# Patient Record
Sex: MALE | Race: WHITE | NOT HISPANIC OR LATINO | Employment: UNEMPLOYED | RURAL
[De-identification: names, ages, dates, MRNs, and addresses within clinical notes are randomized per-mention and may not be internally consistent; named-entity substitution may affect disease eponyms.]

---

## 2020-05-05 ENCOUNTER — HISTORICAL (OUTPATIENT)
Dept: ADMINISTRATIVE | Facility: HOSPITAL | Age: 25
End: 2020-05-05

## 2020-06-05 ENCOUNTER — HISTORICAL (OUTPATIENT)
Dept: ADMINISTRATIVE | Facility: HOSPITAL | Age: 25
End: 2020-06-05

## 2020-09-14 ENCOUNTER — HISTORICAL (OUTPATIENT)
Dept: ADMINISTRATIVE | Facility: HOSPITAL | Age: 25
End: 2020-09-14

## 2021-03-01 ENCOUNTER — HISTORICAL (OUTPATIENT)
Dept: ADMINISTRATIVE | Facility: HOSPITAL | Age: 26
End: 2021-03-01

## 2021-09-09 ENCOUNTER — OFFICE VISIT (OUTPATIENT)
Dept: FAMILY MEDICINE | Facility: CLINIC | Age: 26
End: 2021-09-09
Payer: COMMERCIAL

## 2021-09-09 VITALS
HEART RATE: 94 BPM | SYSTOLIC BLOOD PRESSURE: 150 MMHG | TEMPERATURE: 99 F | OXYGEN SATURATION: 97 % | RESPIRATION RATE: 16 BRPM | BODY MASS INDEX: 35.55 KG/M2 | HEIGHT: 69 IN | WEIGHT: 240 LBS | DIASTOLIC BLOOD PRESSURE: 94 MMHG

## 2021-09-09 DIAGNOSIS — M79.671 RIGHT FOOT PAIN: Primary | ICD-10-CM

## 2021-09-09 PROCEDURE — 1160F PR REVIEW ALL MEDS BY PRESCRIBER/CLIN PHARMACIST DOCUMENTED: ICD-10-PCS | Mod: ,,, | Performed by: NURSE PRACTITIONER

## 2021-09-09 PROCEDURE — 1160F RVW MEDS BY RX/DR IN RCRD: CPT | Mod: ,,, | Performed by: NURSE PRACTITIONER

## 2021-09-09 PROCEDURE — 1159F PR MEDICATION LIST DOCUMENTED IN MEDICAL RECORD: ICD-10-PCS | Mod: ,,, | Performed by: NURSE PRACTITIONER

## 2021-09-09 PROCEDURE — 99213 OFFICE O/P EST LOW 20 MIN: CPT | Mod: ,,, | Performed by: NURSE PRACTITIONER

## 2021-09-09 PROCEDURE — 3077F SYST BP >= 140 MM HG: CPT | Mod: ,,, | Performed by: NURSE PRACTITIONER

## 2021-09-09 PROCEDURE — 84550 URIC ACID: ICD-10-PCS | Mod: ,,, | Performed by: CLINICAL MEDICAL LABORATORY

## 2021-09-09 PROCEDURE — 3080F DIAST BP >= 90 MM HG: CPT | Mod: ,,, | Performed by: NURSE PRACTITIONER

## 2021-09-09 PROCEDURE — 3008F BODY MASS INDEX DOCD: CPT | Mod: ,,, | Performed by: NURSE PRACTITIONER

## 2021-09-09 PROCEDURE — 3080F PR MOST RECENT DIASTOLIC BLOOD PRESSURE >= 90 MM HG: ICD-10-PCS | Mod: ,,, | Performed by: NURSE PRACTITIONER

## 2021-09-09 PROCEDURE — 1159F MED LIST DOCD IN RCRD: CPT | Mod: ,,, | Performed by: NURSE PRACTITIONER

## 2021-09-09 PROCEDURE — 3077F PR MOST RECENT SYSTOLIC BLOOD PRESSURE >= 140 MM HG: ICD-10-PCS | Mod: ,,, | Performed by: NURSE PRACTITIONER

## 2021-09-09 PROCEDURE — 99213 PR OFFICE/OUTPT VISIT, EST, LEVL III, 20-29 MIN: ICD-10-PCS | Mod: ,,, | Performed by: NURSE PRACTITIONER

## 2021-09-09 PROCEDURE — 3008F PR BODY MASS INDEX (BMI) DOCUMENTED: ICD-10-PCS | Mod: ,,, | Performed by: NURSE PRACTITIONER

## 2021-09-09 PROCEDURE — 84550 ASSAY OF BLOOD/URIC ACID: CPT | Mod: ,,, | Performed by: CLINICAL MEDICAL LABORATORY

## 2021-09-09 RX ORDER — DICLOFENAC SODIUM 75 MG/1
75 TABLET, DELAYED RELEASE ORAL 2 TIMES DAILY PRN
Qty: 60 TABLET | Refills: 0 | Status: SHIPPED | OUTPATIENT
Start: 2021-09-09 | End: 2022-08-29

## 2021-09-10 LAB — URATE SERPL-MCNC: 6.3 MG/DL (ref 3.5–7.2)

## 2021-09-11 ENCOUNTER — PATIENT MESSAGE (OUTPATIENT)
Dept: FAMILY MEDICINE | Facility: CLINIC | Age: 26
End: 2021-09-11

## 2021-09-12 ENCOUNTER — OFFICE VISIT (OUTPATIENT)
Dept: FAMILY MEDICINE | Facility: CLINIC | Age: 26
End: 2021-09-12
Payer: COMMERCIAL

## 2021-09-12 VITALS
OXYGEN SATURATION: 98 % | HEART RATE: 72 BPM | SYSTOLIC BLOOD PRESSURE: 146 MMHG | DIASTOLIC BLOOD PRESSURE: 77 MMHG | TEMPERATURE: 99 F

## 2021-09-12 DIAGNOSIS — M79.671 RIGHT FOOT PAIN: Primary | ICD-10-CM

## 2021-09-12 DIAGNOSIS — R93.6 ABNORMAL X-RAY OF LOWER EXTREMITY: ICD-10-CM

## 2021-09-12 PROCEDURE — 99051 MED SERV EVE/WKEND/HOLIDAY: CPT | Mod: ,,, | Performed by: NURSE PRACTITIONER

## 2021-09-12 PROCEDURE — 99213 OFFICE O/P EST LOW 20 MIN: CPT | Mod: 25,,, | Performed by: NURSE PRACTITIONER

## 2021-09-12 PROCEDURE — 1159F MED LIST DOCD IN RCRD: CPT | Mod: ,,, | Performed by: NURSE PRACTITIONER

## 2021-09-12 PROCEDURE — 99213 PR OFFICE/OUTPT VISIT, EST, LEVL III, 20-29 MIN: ICD-10-PCS | Mod: 25,,, | Performed by: NURSE PRACTITIONER

## 2021-09-12 PROCEDURE — 96372 THER/PROPH/DIAG INJ SC/IM: CPT | Mod: ,,, | Performed by: NURSE PRACTITIONER

## 2021-09-12 PROCEDURE — 3077F PR MOST RECENT SYSTOLIC BLOOD PRESSURE >= 140 MM HG: ICD-10-PCS | Mod: ,,, | Performed by: NURSE PRACTITIONER

## 2021-09-12 PROCEDURE — 3078F PR MOST RECENT DIASTOLIC BLOOD PRESSURE < 80 MM HG: ICD-10-PCS | Mod: ,,, | Performed by: NURSE PRACTITIONER

## 2021-09-12 PROCEDURE — 1160F RVW MEDS BY RX/DR IN RCRD: CPT | Mod: ,,, | Performed by: NURSE PRACTITIONER

## 2021-09-12 PROCEDURE — 96372 PR INJECTION,THERAP/PROPH/DIAG2ST, IM OR SUBCUT: ICD-10-PCS | Mod: ,,, | Performed by: NURSE PRACTITIONER

## 2021-09-12 PROCEDURE — 99051 PR MEDICAL SERVICES, EVE/WKEND/HOLIDAY: ICD-10-PCS | Mod: ,,, | Performed by: NURSE PRACTITIONER

## 2021-09-12 PROCEDURE — 3077F SYST BP >= 140 MM HG: CPT | Mod: ,,, | Performed by: NURSE PRACTITIONER

## 2021-09-12 PROCEDURE — 1159F PR MEDICATION LIST DOCUMENTED IN MEDICAL RECORD: ICD-10-PCS | Mod: ,,, | Performed by: NURSE PRACTITIONER

## 2021-09-12 PROCEDURE — 1160F PR REVIEW ALL MEDS BY PRESCRIBER/CLIN PHARMACIST DOCUMENTED: ICD-10-PCS | Mod: ,,, | Performed by: NURSE PRACTITIONER

## 2021-09-12 PROCEDURE — 3078F DIAST BP <80 MM HG: CPT | Mod: ,,, | Performed by: NURSE PRACTITIONER

## 2021-09-12 RX ORDER — DEXAMETHASONE SODIUM PHOSPHATE 4 MG/ML
6 INJECTION, SOLUTION INTRA-ARTICULAR; INTRALESIONAL; INTRAMUSCULAR; INTRAVENOUS; SOFT TISSUE
Status: COMPLETED | OUTPATIENT
Start: 2021-09-12 | End: 2021-09-12

## 2021-09-12 RX ORDER — KETOROLAC TROMETHAMINE 30 MG/ML
60 INJECTION, SOLUTION INTRAMUSCULAR; INTRAVENOUS
Status: COMPLETED | OUTPATIENT
Start: 2021-09-12 | End: 2021-09-12

## 2021-09-12 RX ORDER — TRAMADOL HYDROCHLORIDE 50 MG/1
50 TABLET ORAL EVERY 6 HOURS PRN
Qty: 12 TABLET | Refills: 0 | Status: SHIPPED | OUTPATIENT
Start: 2021-09-12 | End: 2021-09-24

## 2021-09-12 RX ORDER — PREDNISONE 20 MG/1
TABLET ORAL
Qty: 9 TABLET | Refills: 0 | Status: SHIPPED | OUTPATIENT
Start: 2021-09-12 | End: 2021-09-18

## 2021-09-12 RX ADMIN — DEXAMETHASONE SODIUM PHOSPHATE 6 MG: 4 INJECTION, SOLUTION INTRA-ARTICULAR; INTRALESIONAL; INTRAMUSCULAR; INTRAVENOUS; SOFT TISSUE at 12:09

## 2021-09-12 RX ADMIN — KETOROLAC TROMETHAMINE 60 MG: 30 INJECTION, SOLUTION INTRAMUSCULAR; INTRAVENOUS at 12:09

## 2021-09-20 ENCOUNTER — HOSPITAL ENCOUNTER (OUTPATIENT)
Dept: RADIOLOGY | Facility: HOSPITAL | Age: 26
Discharge: HOME | End: 2021-09-20
Attending: ORTHOPAEDIC SURGERY
Payer: COMMERCIAL

## 2021-09-20 DIAGNOSIS — M79.671 ACUTE FOOT PAIN, RIGHT: ICD-10-CM

## 2021-09-20 PROCEDURE — 73630 X-RAY EXAM OF FOOT: CPT | Mod: TC,RT

## 2021-10-06 ENCOUNTER — HOSPITAL ENCOUNTER (OUTPATIENT)
Dept: RADIOLOGY | Facility: HOSPITAL | Age: 26
Discharge: HOME | End: 2021-10-06
Attending: ORTHOPAEDIC SURGERY
Payer: COMMERCIAL

## 2021-10-06 DIAGNOSIS — Z47.89 ORTHOPEDIC AFTERCARE: ICD-10-CM

## 2021-10-06 PROBLEM — S92.324A CLOSED NONDISPLACED FRACTURE OF SECOND METATARSAL BONE OF RIGHT FOOT: Status: ACTIVE | Noted: 2021-10-06

## 2021-10-06 PROBLEM — S92.324D CLOSED NONDISPLACED FRACTURE OF SECOND METATARSAL BONE OF RIGHT FOOT WITH ROUTINE HEALING: Status: ACTIVE | Noted: 2021-10-06

## 2021-10-06 PROCEDURE — 73630 X-RAY EXAM OF FOOT: CPT | Mod: TC,RT

## 2022-01-02 ENCOUNTER — OFFICE VISIT (OUTPATIENT)
Dept: FAMILY MEDICINE | Facility: CLINIC | Age: 27
End: 2022-01-02
Payer: COMMERCIAL

## 2022-01-02 VITALS
TEMPERATURE: 98 F | SYSTOLIC BLOOD PRESSURE: 162 MMHG | BODY MASS INDEX: 35.55 KG/M2 | HEART RATE: 99 BPM | WEIGHT: 240 LBS | HEIGHT: 69 IN | DIASTOLIC BLOOD PRESSURE: 102 MMHG | OXYGEN SATURATION: 98 %

## 2022-01-02 DIAGNOSIS — J02.9 PHARYNGITIS, UNSPECIFIED ETIOLOGY: ICD-10-CM

## 2022-01-02 DIAGNOSIS — U07.1 COVID: Primary | ICD-10-CM

## 2022-01-02 DIAGNOSIS — J06.9 UPPER RESPIRATORY TRACT INFECTION, UNSPECIFIED TYPE: ICD-10-CM

## 2022-01-02 LAB
CTP QC/QA: YES
CTP QC/QA: YES
FLUAV AG NPH QL: NEGATIVE
FLUBV AG NPH QL: NEGATIVE
S PYO RRNA THROAT QL PROBE: NEGATIVE
SARS-COV-2 AG RESP QL IA.RAPID: POSITIVE

## 2022-01-02 PROCEDURE — 1160F RVW MEDS BY RX/DR IN RCRD: CPT | Mod: ,,, | Performed by: NURSE PRACTITIONER

## 2022-01-02 PROCEDURE — 87428 POCT SARS-COV2 (COVID) WITH FLU ANTIGEN: ICD-10-PCS | Mod: QW,,, | Performed by: NURSE PRACTITIONER

## 2022-01-02 PROCEDURE — 3080F DIAST BP >= 90 MM HG: CPT | Mod: ,,, | Performed by: NURSE PRACTITIONER

## 2022-01-02 PROCEDURE — 1159F PR MEDICATION LIST DOCUMENTED IN MEDICAL RECORD: ICD-10-PCS | Mod: ,,, | Performed by: NURSE PRACTITIONER

## 2022-01-02 PROCEDURE — 1159F MED LIST DOCD IN RCRD: CPT | Mod: ,,, | Performed by: NURSE PRACTITIONER

## 2022-01-02 PROCEDURE — 3080F PR MOST RECENT DIASTOLIC BLOOD PRESSURE >= 90 MM HG: ICD-10-PCS | Mod: ,,, | Performed by: NURSE PRACTITIONER

## 2022-01-02 PROCEDURE — 3008F BODY MASS INDEX DOCD: CPT | Mod: ,,, | Performed by: NURSE PRACTITIONER

## 2022-01-02 PROCEDURE — 87428 SARSCOV & INF VIR A&B AG IA: CPT | Mod: QW,,, | Performed by: NURSE PRACTITIONER

## 2022-01-02 PROCEDURE — 3008F PR BODY MASS INDEX (BMI) DOCUMENTED: ICD-10-PCS | Mod: ,,, | Performed by: NURSE PRACTITIONER

## 2022-01-02 PROCEDURE — 99051 PR MEDICAL SERVICES, EVE/WKEND/HOLIDAY: ICD-10-PCS | Mod: ,,, | Performed by: NURSE PRACTITIONER

## 2022-01-02 PROCEDURE — 87880 STREP A ASSAY W/OPTIC: CPT | Mod: QW,,, | Performed by: NURSE PRACTITIONER

## 2022-01-02 PROCEDURE — 3077F PR MOST RECENT SYSTOLIC BLOOD PRESSURE >= 140 MM HG: ICD-10-PCS | Mod: ,,, | Performed by: NURSE PRACTITIONER

## 2022-01-02 PROCEDURE — 3077F SYST BP >= 140 MM HG: CPT | Mod: ,,, | Performed by: NURSE PRACTITIONER

## 2022-01-02 PROCEDURE — 99213 PR OFFICE/OUTPT VISIT, EST, LEVL III, 20-29 MIN: ICD-10-PCS | Mod: ,,, | Performed by: NURSE PRACTITIONER

## 2022-01-02 PROCEDURE — 99213 OFFICE O/P EST LOW 20 MIN: CPT | Mod: ,,, | Performed by: NURSE PRACTITIONER

## 2022-01-02 PROCEDURE — 1160F PR REVIEW ALL MEDS BY PRESCRIBER/CLIN PHARMACIST DOCUMENTED: ICD-10-PCS | Mod: ,,, | Performed by: NURSE PRACTITIONER

## 2022-01-02 PROCEDURE — 99051 MED SERV EVE/WKEND/HOLIDAY: CPT | Mod: ,,, | Performed by: NURSE PRACTITIONER

## 2022-01-02 PROCEDURE — 87880 POCT RAPID STREP A: ICD-10-PCS | Mod: QW,,, | Performed by: NURSE PRACTITIONER

## 2022-01-02 RX ORDER — PROMETHAZINE HYDROCHLORIDE AND DEXTROMETHORPHAN HYDROBROMIDE 6.25; 15 MG/5ML; MG/5ML
5 SYRUP ORAL EVERY 6 HOURS PRN
Qty: 150 ML | Refills: 0 | Status: SHIPPED | OUTPATIENT
Start: 2022-01-02 | End: 2022-01-12

## 2022-01-02 NOTE — PROGRESS NOTES
"Subjective:       Patient ID: Sha Goldberg is a 26 y.o. male.    Chief Complaint: Headache, Sore Throat, Nasal Congestion, and Fever    HPI  Review of Systems   Constitutional: Positive for fatigue and fever.   HENT: Positive for congestion, postnasal drip, rhinorrhea and sore throat.    Respiratory: Positive for cough.    Cardiovascular: Negative.    Gastrointestinal: Negative.    Musculoskeletal: Positive for myalgias.   Neurological: Positive for headaches.         Reviewed family, medical, surgical, and social history.    Objective:      BP (!) 162/102   Pulse 99   Temp 98 °F (36.7 °C)   Ht 5' 9" (1.753 m)   Wt 108.9 kg (240 lb)   SpO2 98%   BMI 35.44 kg/m²   Physical Exam  Vitals and nursing note reviewed.   Constitutional:       General: He is not in acute distress.     Appearance: Normal appearance. He is not ill-appearing, toxic-appearing or diaphoretic.   HENT:      Head: Normocephalic.      Right Ear: Hearing, tympanic membrane, ear canal and external ear normal.      Left Ear: Hearing, tympanic membrane, ear canal and external ear normal.      Nose: Nasal tenderness, mucosal edema, congestion and rhinorrhea present. Rhinorrhea is clear.      Right Turbinates: Enlarged and swollen.      Left Turbinates: Enlarged and swollen.      Right Sinus: No maxillary sinus tenderness or frontal sinus tenderness.      Left Sinus: No maxillary sinus tenderness or frontal sinus tenderness.      Mouth/Throat:      Mouth: Mucous membranes are moist.      Comments: Post nasal drainage  Cardiovascular:      Rate and Rhythm: Normal rate and regular rhythm.      Heart sounds: Normal heart sounds.   Pulmonary:      Effort: Pulmonary effort is normal.      Breath sounds: Normal breath sounds.   Skin:     General: Skin is warm and dry.   Neurological:      Mental Status: He is alert.   Psychiatric:         Mood and Affect: Mood normal.         Behavior: Behavior normal.         Thought Content: Thought content normal. "         Judgment: Judgment normal.           Office Visit on 01/02/2022   Component Date Value Ref Range Status    Rapid Strep A Screen 01/02/2022 Negative  Negative Final     Acceptable 01/02/2022 Yes   Final    SARS Coronavirus 2 Antigen 01/02/2022 Positive* Negative Final    Rapid Influenza A Ag 01/02/2022 Negative  Negative Final    Rapid Influenza B Ag 01/02/2022 Negative  Negative Final     Acceptable 01/02/2022 Yes   Final      Assessment:       1. COVID    2. Pharyngitis, unspecified etiology    3. Upper respiratory tract infection, unspecified type        Plan:       COVID    Pharyngitis, unspecified etiology  -     POCT rapid strep A    Upper respiratory tract infection, unspecified type  -     POCT SARS-COV2 (COVID) with Flu Antigen    Quarantine for 7 days  OTC meds for symptomatic relief  Drink plenty of fluids  Go to ER with any respiratory distress  Copy of result and work/school note given. Copy of these instructions given  RTC PRN          Risks, benefits, and side effects were discussed with the patient. All questions were answered to the fullest satisfaction of the patient, and pt verbalized understanding and agreement to treatment plan. Pt was to call with any new or worsening symptoms, or present to the ER.

## 2022-02-04 ENCOUNTER — OFFICE VISIT (OUTPATIENT)
Dept: FAMILY MEDICINE | Facility: CLINIC | Age: 27
End: 2022-02-04
Payer: COMMERCIAL

## 2022-02-04 VITALS
DIASTOLIC BLOOD PRESSURE: 88 MMHG | TEMPERATURE: 98 F | SYSTOLIC BLOOD PRESSURE: 140 MMHG | OXYGEN SATURATION: 98 % | WEIGHT: 240 LBS | HEIGHT: 70 IN | BODY MASS INDEX: 34.36 KG/M2

## 2022-02-04 DIAGNOSIS — J32.9 SINUSITIS, UNSPECIFIED CHRONICITY, UNSPECIFIED LOCATION: Primary | ICD-10-CM

## 2022-02-04 DIAGNOSIS — Z20.828 CONTACT WITH AND (SUSPECTED) EXPOSURE TO OTHER VIRAL COMMUNICABLE DISEASES: ICD-10-CM

## 2022-02-04 LAB
CTP QC/QA: YES
CTP QC/QA: YES
FLUAV AG NPH QL: NEGATIVE
FLUBV AG NPH QL: NEGATIVE
SARS-COV-2 AG RESP QL IA.RAPID: NEGATIVE

## 2022-02-04 PROCEDURE — 3008F PR BODY MASS INDEX (BMI) DOCUMENTED: ICD-10-PCS | Mod: ,,, | Performed by: FAMILY MEDICINE

## 2022-02-04 PROCEDURE — 1160F RVW MEDS BY RX/DR IN RCRD: CPT | Mod: ,,, | Performed by: FAMILY MEDICINE

## 2022-02-04 PROCEDURE — 87804 POCT INFLUENZA A/B: ICD-10-PCS | Mod: QW,,, | Performed by: FAMILY MEDICINE

## 2022-02-04 PROCEDURE — 99214 OFFICE O/P EST MOD 30 MIN: CPT | Mod: 25,,, | Performed by: FAMILY MEDICINE

## 2022-02-04 PROCEDURE — 3077F PR MOST RECENT SYSTOLIC BLOOD PRESSURE >= 140 MM HG: ICD-10-PCS | Mod: ,,, | Performed by: FAMILY MEDICINE

## 2022-02-04 PROCEDURE — 1159F PR MEDICATION LIST DOCUMENTED IN MEDICAL RECORD: ICD-10-PCS | Mod: ,,, | Performed by: FAMILY MEDICINE

## 2022-02-04 PROCEDURE — 87804 INFLUENZA ASSAY W/OPTIC: CPT | Mod: QW,,, | Performed by: FAMILY MEDICINE

## 2022-02-04 PROCEDURE — 99214 PR OFFICE/OUTPT VISIT, EST, LEVL IV, 30-39 MIN: ICD-10-PCS | Mod: 25,,, | Performed by: FAMILY MEDICINE

## 2022-02-04 PROCEDURE — 1160F PR REVIEW ALL MEDS BY PRESCRIBER/CLIN PHARMACIST DOCUMENTED: ICD-10-PCS | Mod: ,,, | Performed by: FAMILY MEDICINE

## 2022-02-04 PROCEDURE — 3077F SYST BP >= 140 MM HG: CPT | Mod: ,,, | Performed by: FAMILY MEDICINE

## 2022-02-04 PROCEDURE — 3079F DIAST BP 80-89 MM HG: CPT | Mod: ,,, | Performed by: FAMILY MEDICINE

## 2022-02-04 PROCEDURE — 87426 SARSCOV CORONAVIRUS AG IA: CPT | Mod: QW,,, | Performed by: FAMILY MEDICINE

## 2022-02-04 PROCEDURE — 1159F MED LIST DOCD IN RCRD: CPT | Mod: ,,, | Performed by: FAMILY MEDICINE

## 2022-02-04 PROCEDURE — 96372 PR INJECTION,THERAP/PROPH/DIAG2ST, IM OR SUBCUT: ICD-10-PCS | Mod: ,,, | Performed by: FAMILY MEDICINE

## 2022-02-04 PROCEDURE — 96372 THER/PROPH/DIAG INJ SC/IM: CPT | Mod: ,,, | Performed by: FAMILY MEDICINE

## 2022-02-04 PROCEDURE — 3079F PR MOST RECENT DIASTOLIC BLOOD PRESSURE 80-89 MM HG: ICD-10-PCS | Mod: ,,, | Performed by: FAMILY MEDICINE

## 2022-02-04 PROCEDURE — 87426 SARS CORONAVIRUS 2 ANTIGEN POCT: ICD-10-PCS | Mod: QW,,, | Performed by: FAMILY MEDICINE

## 2022-02-04 PROCEDURE — 3008F BODY MASS INDEX DOCD: CPT | Mod: ,,, | Performed by: FAMILY MEDICINE

## 2022-02-04 RX ORDER — PREDNISONE 20 MG/1
20 TABLET ORAL DAILY
Qty: 5 TABLET | Refills: 0 | Status: SHIPPED | OUTPATIENT
Start: 2022-02-04 | End: 2022-02-09

## 2022-02-04 RX ORDER — AMOXICILLIN AND CLAVULANATE POTASSIUM 875; 125 MG/1; MG/1
1 TABLET, FILM COATED ORAL 2 TIMES DAILY
Qty: 14 TABLET | Refills: 0 | Status: SHIPPED | OUTPATIENT
Start: 2022-02-04 | End: 2022-02-11

## 2022-02-04 RX ORDER — ONDANSETRON 4 MG/1
4 TABLET, FILM COATED ORAL EVERY 8 HOURS PRN
Qty: 10 TABLET | Refills: 0 | Status: SHIPPED | OUTPATIENT
Start: 2022-02-04 | End: 2022-08-29

## 2022-02-04 RX ORDER — CEFTRIAXONE 1 G/1
1 INJECTION, POWDER, FOR SOLUTION INTRAMUSCULAR; INTRAVENOUS
Status: COMPLETED | OUTPATIENT
Start: 2022-02-04 | End: 2022-02-04

## 2022-02-04 RX ORDER — DEXAMETHASONE SODIUM PHOSPHATE 4 MG/ML
4 INJECTION, SOLUTION INTRA-ARTICULAR; INTRALESIONAL; INTRAMUSCULAR; INTRAVENOUS; SOFT TISSUE
Status: COMPLETED | OUTPATIENT
Start: 2022-02-04 | End: 2022-02-04

## 2022-02-04 RX ADMIN — DEXAMETHASONE SODIUM PHOSPHATE 4 MG: 4 INJECTION, SOLUTION INTRA-ARTICULAR; INTRALESIONAL; INTRAMUSCULAR; INTRAVENOUS; SOFT TISSUE at 10:02

## 2022-02-04 RX ADMIN — CEFTRIAXONE 1 G: 1 INJECTION, POWDER, FOR SOLUTION INTRAMUSCULAR; INTRAVENOUS at 10:02

## 2022-02-04 NOTE — PROGRESS NOTES
Subjective:       Patient ID: Sha Goldberg is a 26 y.o. male.    Chief Complaint: Sore Throat, Nasal drainage, Cough, and Diarrhea (X2 days)    Pt with sore throat, congestion, drainage, fatigue, nausea, diarrhea x 2 days, had covid 1 month ago.     Review of Systems   Constitutional: Positive for fatigue. Negative for activity change, appetite change, chills, diaphoresis, fever and unexpected weight change.   HENT: Positive for nasal congestion, postnasal drip, rhinorrhea and sinus pressure/congestion. Negative for dental problem, drooling, ear discharge, ear pain, facial swelling, hearing loss, mouth sores, nosebleeds, sneezing, sore throat, tinnitus, trouble swallowing, voice change and goiter.    Eyes: Negative for photophobia, pain, discharge, redness, itching and visual disturbance.   Respiratory: Negative for apnea, cough, choking, chest tightness, shortness of breath, wheezing and stridor.    Cardiovascular: Negative for chest pain, palpitations, leg swelling and claudication.   Gastrointestinal: Positive for nausea. Negative for abdominal distention, abdominal pain, anal bleeding, blood in stool, change in bowel habit, constipation, diarrhea, vomiting, reflux, fecal incontinence and change in bowel habit.   Endocrine: Negative for cold intolerance, heat intolerance, polydipsia, polyphagia and polyuria.   Genitourinary: Negative for bladder incontinence, decreased urine volume, difficulty urinating, discharge, dysuria, enuresis, erectile dysfunction, flank pain, frequency, genital sores, hematuria, penile pain, testicular pain and urgency.   Musculoskeletal: Negative for arthralgias, back pain, gait problem, joint swelling, leg pain, myalgias, neck pain, neck stiffness and joint deformity.   Integumentary:  Negative for pallor, rash, wound and mole/lesion.   Allergic/Immunologic: Negative for environmental allergies, food allergies and frequent infections.   Neurological: Negative for dizziness,  vertigo, tremors, seizures, syncope, facial asymmetry, speech difficulty, weakness, light-headedness, numbness, headaches, disturbances in coordination, memory loss and coordination difficulties.   Hematological: Negative for adenopathy. Does not bruise/bleed easily.   Psychiatric/Behavioral: Negative for agitation, behavioral problems, confusion, decreased concentration, dysphoric mood, hallucinations, self-injury, sleep disturbance and suicidal ideas. The patient is not nervous/anxious and is not hyperactive.          Objective:      Physical Exam  Vitals reviewed.   Constitutional:       Appearance: Normal appearance. He is normal weight.   HENT:      Head: Normocephalic and atraumatic.      Right Ear: Tympanic membrane and ear canal normal.      Left Ear: Tympanic membrane, ear canal and external ear normal.      Nose: Congestion and rhinorrhea present.      Mouth/Throat:      Mouth: Mucous membranes are moist.      Pharynx: Oropharynx is clear. Posterior oropharyngeal erythema present.   Eyes:      Extraocular Movements: Extraocular movements intact.      Conjunctiva/sclera: Conjunctivae normal.      Pupils: Pupils are equal, round, and reactive to light.   Cardiovascular:      Rate and Rhythm: Normal rate and regular rhythm.      Pulses: Normal pulses.      Heart sounds: Normal heart sounds.   Pulmonary:      Effort: Pulmonary effort is normal.      Breath sounds: Normal breath sounds.   Abdominal:      General: Abdomen is flat. Bowel sounds are normal.      Palpations: Abdomen is soft.   Musculoskeletal:         General: Normal range of motion.      Cervical back: Normal range of motion and neck supple.   Skin:     General: Skin is warm and dry.   Neurological:      General: No focal deficit present.      Mental Status: He is alert and oriented to person, place, and time. Mental status is at baseline.   Psychiatric:         Mood and Affect: Mood normal.         Behavior: Behavior normal.         Thought  Content: Thought content normal.         Judgment: Judgment normal.         Assessment:       1. Sinusitis, unspecified chronicity, unspecified location    2. Contact with and (suspected) exposure to other viral communicable diseases        Plan:     Sinusitis, unspecified chronicity, unspecified location  -     cefTRIAXone injection 1 g  -     dexamethasone injection 4 mg    Contact with and (suspected) exposure to other viral communicable diseases  -     SARS Coronavirus 2 Antigen, POCT  -     POCT Influenza A/B    Other orders  -     predniSONE (DELTASONE) 20 MG tablet; Take 1 tablet (20 mg total) by mouth once daily. for 5 days  Dispense: 5 tablet; Refill: 0  -     amoxicillin-clavulanate 875-125mg (AUGMENTIN) 875-125 mg per tablet; Take 1 tablet by mouth 2 (two) times a day. for 7 days  Dispense: 14 tablet; Refill: 0  -     ondansetron (ZOFRAN) 4 MG tablet; Take 1 tablet (4 mg total) by mouth every 8 (eight) hours as needed for Nausea.  Dispense: 10 tablet; Refill: 0       Covid negative, treat for sinusitis.

## 2022-02-04 NOTE — LETTER
February 4, 2022      [facility]  [address] [place] [address]  Phone: [phone]  Fax: [phone]       Patient: Sha Goldberg   YOB: 1995  Date of Visit: 02/04/2022    To Whom It May Concern:    Mila Goldberg  was at [facility] on 02/04/2022. The patient may return to work/school on 02/04/2022 with no restrictions. If you have any questions or concerns, or if I can be of further assistance, please do not hesitate to contact me.    Sincerely,    Prisca Lema, CMA

## 2022-07-19 ENCOUNTER — OFFICE VISIT (OUTPATIENT)
Dept: FAMILY MEDICINE | Facility: CLINIC | Age: 27
End: 2022-07-19
Payer: COMMERCIAL

## 2022-07-19 VITALS
HEIGHT: 70 IN | OXYGEN SATURATION: 97 % | WEIGHT: 243 LBS | SYSTOLIC BLOOD PRESSURE: 118 MMHG | DIASTOLIC BLOOD PRESSURE: 76 MMHG | HEART RATE: 112 BPM | TEMPERATURE: 99 F | BODY MASS INDEX: 34.79 KG/M2

## 2022-07-19 DIAGNOSIS — R05.9 COUGH: Primary | ICD-10-CM

## 2022-07-19 DIAGNOSIS — J45.20 MILD INTERMITTENT REACTIVE AIRWAY DISEASE WITHOUT COMPLICATION: ICD-10-CM

## 2022-07-19 PROCEDURE — 3008F PR BODY MASS INDEX (BMI) DOCUMENTED: ICD-10-PCS | Mod: ,,, | Performed by: FAMILY MEDICINE

## 2022-07-19 PROCEDURE — 3078F PR MOST RECENT DIASTOLIC BLOOD PRESSURE < 80 MM HG: ICD-10-PCS | Mod: ,,, | Performed by: FAMILY MEDICINE

## 2022-07-19 PROCEDURE — 3078F DIAST BP <80 MM HG: CPT | Mod: ,,, | Performed by: FAMILY MEDICINE

## 2022-07-19 PROCEDURE — 3008F BODY MASS INDEX DOCD: CPT | Mod: ,,, | Performed by: FAMILY MEDICINE

## 2022-07-19 PROCEDURE — 3074F SYST BP LT 130 MM HG: CPT | Mod: ,,, | Performed by: FAMILY MEDICINE

## 2022-07-19 PROCEDURE — 3074F PR MOST RECENT SYSTOLIC BLOOD PRESSURE < 130 MM HG: ICD-10-PCS | Mod: ,,, | Performed by: FAMILY MEDICINE

## 2022-07-19 PROCEDURE — 99203 PR OFFICE/OUTPT VISIT, NEW, LEVL III, 30-44 MIN: ICD-10-PCS | Mod: ,,, | Performed by: FAMILY MEDICINE

## 2022-07-19 PROCEDURE — 99203 OFFICE O/P NEW LOW 30 MIN: CPT | Mod: ,,, | Performed by: FAMILY MEDICINE

## 2022-07-19 RX ORDER — ALBUTEROL SULFATE 90 UG/1
2 AEROSOL, METERED RESPIRATORY (INHALATION) EVERY 6 HOURS PRN
Qty: 18 G | Refills: 0 | Status: SHIPPED | OUTPATIENT
Start: 2022-07-19 | End: 2022-08-29

## 2022-07-20 ENCOUNTER — HOSPITAL ENCOUNTER (EMERGENCY)
Facility: HOSPITAL | Age: 27
Discharge: HOME | End: 2022-07-20
Payer: COMMERCIAL

## 2022-07-20 VITALS
HEART RATE: 109 BPM | SYSTOLIC BLOOD PRESSURE: 139 MMHG | TEMPERATURE: 98 F | BODY MASS INDEX: 32.02 KG/M2 | WEIGHT: 220 LBS | RESPIRATION RATE: 12 BRPM | OXYGEN SATURATION: 98 % | DIASTOLIC BLOOD PRESSURE: 88 MMHG

## 2022-07-20 DIAGNOSIS — R06.00 DYSPNEA: ICD-10-CM

## 2022-07-20 DIAGNOSIS — R06.00 DYSPNEA, UNSPECIFIED TYPE: Primary | ICD-10-CM

## 2022-07-20 LAB
ALBUMIN SERPL BCP-MCNC: 3.6 G/DL (ref 3.5–5)
ALBUMIN/GLOB SERPL: 0.9 {RATIO}
ALP SERPL-CCNC: 130 U/L (ref 45–115)
ALT SERPL W P-5'-P-CCNC: 64 U/L (ref 16–61)
AMPHET UR QL SCN: NEGATIVE
ANION GAP SERPL CALCULATED.3IONS-SCNC: 10 MMOL/L (ref 7–16)
AST SERPL W P-5'-P-CCNC: 22 U/L (ref 15–37)
BARBITURATES UR QL SCN: NEGATIVE
BASOPHILS # BLD AUTO: 0.04 K/UL (ref 0–0.2)
BASOPHILS NFR BLD AUTO: 0.3 % (ref 0–1)
BENZODIAZ METAB UR QL SCN: NEGATIVE
BILIRUB SERPL-MCNC: 0.5 MG/DL (ref 0–1.2)
BILIRUB UR QL STRIP: NEGATIVE
BUN SERPL-MCNC: 7 MG/DL (ref 7–18)
BUN/CREAT SERPL: 7 (ref 6–20)
CALCIUM SERPL-MCNC: 9 MG/DL (ref 8.5–10.1)
CANNABINOIDS UR QL SCN: NEGATIVE
CHLORIDE SERPL-SCNC: 111 MMOL/L (ref 98–107)
CLARITY UR: CLEAR
CO2 SERPL-SCNC: 26 MMOL/L (ref 21–32)
COCAINE UR QL SCN: NEGATIVE
COLOR UR: ABNORMAL
CREAT SERPL-MCNC: 1.04 MG/DL (ref 0.7–1.3)
DIFFERENTIAL METHOD BLD: ABNORMAL
EOSINOPHIL # BLD AUTO: 0.15 K/UL (ref 0–0.5)
EOSINOPHIL NFR BLD AUTO: 1.3 % (ref 1–4)
ERYTHROCYTE [DISTWIDTH] IN BLOOD BY AUTOMATED COUNT: 13.4 % (ref 11.5–14.5)
GLOBULIN SER-MCNC: 4.1 G/DL (ref 2–4)
GLUCOSE SERPL-MCNC: 105 MG/DL (ref 74–106)
GLUCOSE UR STRIP-MCNC: NORMAL MG/DL
HCT VFR BLD AUTO: 42.2 % (ref 40–54)
HGB BLD-MCNC: 14.7 G/DL (ref 13.5–18)
IMM GRANULOCYTES # BLD AUTO: 0.04 K/UL (ref 0–0.04)
IMM GRANULOCYTES NFR BLD: 0.3 % (ref 0–0.4)
KETONES UR STRIP-SCNC: NEGATIVE MG/DL
LEUKOCYTE ESTERASE UR QL STRIP: NEGATIVE
LYMPHOCYTES # BLD AUTO: 3.2 K/UL (ref 1–4.8)
LYMPHOCYTES NFR BLD AUTO: 27.7 % (ref 27–41)
MCH RBC QN AUTO: 30.3 PG (ref 27–31)
MCHC RBC AUTO-ENTMCNC: 34.8 G/DL (ref 32–36)
MCV RBC AUTO: 87 FL (ref 80–96)
MONOCYTES # BLD AUTO: 0.78 K/UL (ref 0–0.8)
MONOCYTES NFR BLD AUTO: 6.7 % (ref 2–6)
MPC BLD CALC-MCNC: 11 FL (ref 9.4–12.4)
NEUTROPHILS # BLD AUTO: 7.36 K/UL (ref 1.8–7.7)
NEUTROPHILS NFR BLD AUTO: 63.7 % (ref 53–65)
NITRITE UR QL STRIP: NEGATIVE
NRBC # BLD AUTO: 0 X10E3/UL
NRBC, AUTO (.00): 0 %
OPIATES UR QL SCN: NEGATIVE
PCP UR QL SCN: NEGATIVE
PH UR STRIP: 7.5 PH UNITS
PLATELET # BLD AUTO: 313 K/UL (ref 150–400)
POTASSIUM SERPL-SCNC: 3.2 MMOL/L (ref 3.5–5.1)
PROT SERPL-MCNC: 7.7 G/DL (ref 6.4–8.2)
PROT UR QL STRIP: 10
RBC # BLD AUTO: 4.85 M/UL (ref 4.6–6.2)
RBC # UR STRIP: NEGATIVE /UL
SODIUM SERPL-SCNC: 144 MMOL/L (ref 136–145)
SP GR UR STRIP: 1.03
UROBILINOGEN UR STRIP-ACNC: NORMAL MG/DL
WBC # BLD AUTO: 11.57 K/UL (ref 4.5–11)

## 2022-07-20 PROCEDURE — 99283 EMERGENCY DEPT VISIT LOW MDM: CPT | Mod: ,,, | Performed by: REGISTERED NURSE

## 2022-07-20 PROCEDURE — 99285 EMERGENCY DEPT VISIT HI MDM: CPT | Mod: 25

## 2022-07-20 PROCEDURE — 80307 DRUG TEST PRSMV CHEM ANLYZR: CPT | Performed by: REGISTERED NURSE

## 2022-07-20 PROCEDURE — 93005 ELECTROCARDIOGRAM TRACING: CPT

## 2022-07-20 PROCEDURE — 96361 HYDRATE IV INFUSION ADD-ON: CPT

## 2022-07-20 PROCEDURE — 80053 COMPREHEN METABOLIC PANEL: CPT | Performed by: REGISTERED NURSE

## 2022-07-20 PROCEDURE — 81003 URINALYSIS AUTO W/O SCOPE: CPT | Mod: 59 | Performed by: REGISTERED NURSE

## 2022-07-20 PROCEDURE — 93010 EKG 12-LEAD: ICD-10-PCS | Mod: ,,, | Performed by: INTERNAL MEDICINE

## 2022-07-20 PROCEDURE — 25000003 PHARM REV CODE 250: Performed by: REGISTERED NURSE

## 2022-07-20 PROCEDURE — 85025 COMPLETE CBC W/AUTO DIFF WBC: CPT | Performed by: REGISTERED NURSE

## 2022-07-20 PROCEDURE — 36415 COLL VENOUS BLD VENIPUNCTURE: CPT | Performed by: REGISTERED NURSE

## 2022-07-20 PROCEDURE — 96360 HYDRATION IV INFUSION INIT: CPT

## 2022-07-20 PROCEDURE — 93010 ELECTROCARDIOGRAM REPORT: CPT | Mod: ,,, | Performed by: INTERNAL MEDICINE

## 2022-07-20 PROCEDURE — 99283 PR EMERGENCY DEPT VISIT,LEVEL III: ICD-10-PCS | Mod: ,,, | Performed by: REGISTERED NURSE

## 2022-07-20 RX ORDER — SODIUM CHLORIDE 9 MG/ML
1000 INJECTION, SOLUTION INTRAVENOUS
Status: COMPLETED | OUTPATIENT
Start: 2022-07-20 | End: 2022-07-20

## 2022-07-20 RX ORDER — POTASSIUM CHLORIDE 20 MEQ/1
20 TABLET, EXTENDED RELEASE ORAL
Status: COMPLETED | OUTPATIENT
Start: 2022-07-20 | End: 2022-07-20

## 2022-07-20 RX ADMIN — SODIUM CHLORIDE 1000 ML: 9 INJECTION, SOLUTION INTRAVENOUS at 07:07

## 2022-07-20 RX ADMIN — POTASSIUM CHLORIDE 20 MEQ: 1500 TABLET, EXTENDED RELEASE ORAL at 09:07

## 2022-07-20 NOTE — ED TRIAGE NOTES
Pt presents to the ed c/o shortness of breath. Pt used his inhaler and stated that he felt better

## 2022-07-20 NOTE — ED PROVIDER NOTES
"Encounter Date: 7/20/2022       History     Chief Complaint   Patient presents with    Shortness of Breath     26 y-old  male received to CCU 1 with complaint of dyspnea and increased HR. Patient states that he was seen by his PCP yesterday 07/19/2022 and was told that "he probably has COVID asthma." Patient states that he "got hot" while at work earlier today and "I didn't get a chance to eat much." NAD noted. Denies any pain or discomfort. Patient states "I feel fine now."         Review of patient's allergies indicates:  No Known Allergies  Past Medical History:   Diagnosis Date    Acne     Hypertension      History reviewed. No pertinent surgical history.  History reviewed. No pertinent family history.  Social History     Tobacco Use    Smoking status: Never Smoker    Smokeless tobacco: Never Used   Substance Use Topics    Alcohol use: Never    Drug use: Never     Review of Systems   Constitutional: Positive for activity change, diaphoresis and fatigue. Negative for appetite change, chills, fever and unexpected weight change.   HENT: Negative.    Eyes: Negative.    Respiratory: Positive for cough and shortness of breath (resolved upon arrival to ED).    Cardiovascular: Negative.  Negative for chest pain, palpitations and leg swelling.   Gastrointestinal: Negative.    Endocrine: Positive for heat intolerance.   Genitourinary: Negative.    Musculoskeletal: Positive for myalgias.   Skin: Negative.    Allergic/Immunologic: Negative.    Neurological: Negative.    Hematological: Negative.    Psychiatric/Behavioral: Negative.        Physical Exam     Initial Vitals   BP Pulse Resp Temp SpO2   07/20/22 1905 07/20/22 1855 07/20/22 1855 07/20/22 1912 07/20/22 1855   (!) 156/92 (!) 123 10 97.7 °F (36.5 °C) 97 %      MAP       --                Physical Exam    Constitutional: He appears well-developed and well-nourished. He is not diaphoretic.   HENT:   Head: Normocephalic and atraumatic.   Right Ear: " External ear normal.   Left Ear: External ear normal.   Nose: Nose normal.   Mouth/Throat: Oropharynx is clear and moist. No oropharyngeal exudate.   Eyes: Conjunctivae are normal. Right eye exhibits no discharge. Left eye exhibits no discharge. No scleral icterus.   Neck: No thyromegaly present. No tracheal deviation present. No JVD present.   Normal range of motion.  Cardiovascular: Normal rate, regular rhythm, normal heart sounds and intact distal pulses. Exam reveals no gallop and no friction rub.    No murmur heard.  Pulmonary/Chest: Breath sounds normal. No stridor. No respiratory distress. He has no wheezes. He has no rhonchi. He has no rales. He exhibits no tenderness.   Abdominal: Abdomen is soft. Bowel sounds are normal. He exhibits no distension and no mass. There is no abdominal tenderness. There is no rebound and no guarding.   Musculoskeletal:         General: No tenderness or edema. Normal range of motion.      Cervical back: Normal range of motion.     Lymphadenopathy:     He has no cervical adenopathy.   Neurological: He is alert and oriented to person, place, and time. He has normal strength. GCS score is 15. GCS eye subscore is 4. GCS verbal subscore is 5. GCS motor subscore is 6.   Skin: Skin is warm and dry. Capillary refill takes less than 2 seconds.   Psychiatric: He has a normal mood and affect. His behavior is normal. Judgment and thought content normal.         Medical Screening Exam   See Full Note    ED Course   Procedures  Labs Reviewed   COMPREHENSIVE METABOLIC PANEL - Abnormal; Notable for the following components:       Result Value    Potassium 3.2 (*)     Chloride 111 (*)     Globulin 4.1 (*)     Alk Phos 130 (*)     ALT 64 (*)     All other components within normal limits   CBC WITH DIFFERENTIAL - Abnormal; Notable for the following components:    WBC 11.57 (*)     Monocytes % 6.7 (*)     All other components within normal limits   URINALYSIS, REFLEX TO URINE CULTURE - Abnormal;  Notable for the following components:    Protein, UA 10  (*)     All other components within normal limits   CBC W/ AUTO DIFFERENTIAL    Narrative:     The following orders were created for panel order CBC auto differential.  Procedure                               Abnormality         Status                     ---------                               -----------         ------                     CBC with Differential[[id number]]        Abnormal            Final result                 Please view results for these tests on the individual orders.   DRUG SCREEN, URINE (BEAKER)          Imaging Results          X-Ray Chest AP Portable (Final result)  Result time 07/20/22 19:49:06    Final result by Ken Roman MD (07/20/22 19:49:06)                 Impression:      No acute findings.      Electronically signed by: Ken Roman  Date:    07/20/2022  Time:    19:49             Narrative:    EXAMINATION:  XR CHEST AP PORTABLE    CLINICAL HISTORY:  Dyspnea, unspecified    TECHNIQUE:  Single frontal view of the chest was performed.    COMPARISON:  07/19/2022    FINDINGS:  Heart size normal. Lungs clear. No pneumothorax or pleural effusion.                                 Medications   potassium chloride SA CR tablet 20 mEq (has no administration in time range)   0.9%  NaCl infusion (0 mLs Intravenous Stopped 7/20/22 2058)     Medical Decision Making:   ED Management:  Labs WNL with the exception of WBC of 11.57 with monocyte count of 6.7, K+ of 3.2. Normal CXR per radiologist. Will treat K+ with PO K+. Patient to f/u with PCP or retrun to the ED for new or worsening symptoms or otherwise as needed. I discussed this at length with patient and family who verbalize understanding and agree with plan.                    Clinical Impression:   Final diagnoses:  [R06.00] Dyspnea  [R06.00] Dyspnea, unspecified type (Primary)          ED Disposition Condition    Discharge Stable        ED Prescriptions     None        Follow-up  Information     Follow up With Specialties Details Why Contact Info    Primary Care Physician               JODIE Marti  07/20/22 [id number]

## 2022-07-20 NOTE — Clinical Note
"Sha Goldberg (Joshua) was seen and treated in our emergency department on 7/20/2022.  He may return to work on 07/22/2022.       If you have any questions or concerns, please don't hesitate to call.       RN    "

## 2022-07-20 NOTE — Clinical Note
"Sha Goldberg (Joshua) was seen and treated in our emergency department on 7/20/2022.  He may return to work on 07/23/2022.       If you have any questions or concerns, please don't hesitate to call.       RN    "

## 2022-07-21 NOTE — PROGRESS NOTES
[facility]    Chief Complaint      Chief Complaint   Patient presents with    Cough     X 6 Month       History of Present Illness      Sha Goldberg is a 26 y.o. male who presents today for cough. States symptoms present for the last 6 months duration after having Covid. Cough is nonproductive and worse in morning and evening. States he's coughed to the point of vomiting at times. Denies wheezing. Noted occasional GERD symptoms.    Past Medical History:  Past Medical History:   Diagnosis Date    Acne     Hypertension        Past Surgical History:   has no past surgical history on file.    Social History:  Social History     Tobacco Use    Smoking status: Never Smoker    Smokeless tobacco: Never Used   Substance Use Topics    Alcohol use: Never    Drug use: Never       I personally reviewed all past medical, surgical, and social.     Review of Systems   Constitutional: Negative for fatigue and fever.   HENT: Negative for ear pain.    Eyes: Negative for pain and visual disturbance.   Respiratory: Positive for cough and shortness of breath. Negative for chest tightness.    Cardiovascular: Negative for chest pain and leg swelling.   Gastrointestinal: Negative for abdominal pain.   Genitourinary: Negative for difficulty urinating.   Musculoskeletal: Negative for gait problem and myalgias.   Skin: Negative for rash.   Neurological: Negative for dizziness and light-headedness.   Hematological: Does not bruise/bleed easily.        Medications:  Outpatient Encounter Medications as of 7/19/2022   Medication Sig Dispense Refill    albuterol (VENTOLIN HFA) 90 mcg/actuation inhaler Inhale 2 puffs into the lungs every 6 (six) hours as needed for Wheezing. Rescue 18 g 0    diclofenac (VOLTAREN) 75 MG EC tablet Take 1 tablet (75 mg total) by mouth 2 (two) times daily as needed (foot pain). (Patient not taking: Reported on 2/4/2022) 60 tablet 0    ondansetron (ZOFRAN) 4 MG tablet Take 1 tablet (4 mg total)  "by mouth every 8 (eight) hours as needed for Nausea. 10 tablet 0     No facility-administered encounter medications on file as of 7/19/2022.       Allergies:  Review of patient's allergies indicates:  No Known Allergies    Health Maintenance:    There is no immunization history on file for this patient.   Health Maintenance   Topic Date Due    Hepatitis C Screening  Never done    Lipid Panel  Never done    HPV Vaccines (1 - Male 2-dose series) Never done    TETANUS VACCINE  Never done        Physical Exam      Vital Signs  Temp: 98.7 °F (37.1 °C)  Temp src: Oral  Pulse: (!) 112  SpO2: 97 %  BP: 118/76  BP Location: Left arm  Patient Position: Sitting  Height and Weight  Height: 5' 9.5" (176.5 cm)  Weight: 110.2 kg (243 lb)  BSA (Calculated - sq m): 2.32 sq meters  BMI (Calculated): 35.4  Weight in (lb) to have BMI = 25: 171.4]    Physical Exam  Constitutional:       Appearance: Normal appearance.   HENT:      Head: Normocephalic.   Eyes:      Conjunctiva/sclera: Conjunctivae normal.      Pupils: Pupils are equal, round, and reactive to light.   Cardiovascular:      Rate and Rhythm: Normal rate and regular rhythm.      Pulses: Normal pulses.   Pulmonary:      Effort: Pulmonary effort is normal.      Breath sounds: Normal breath sounds.   Abdominal:      General: Bowel sounds are normal. There is no distension.      Palpations: Abdomen is soft.   Musculoskeletal:         General: Normal range of motion.      Right lower leg: No edema.      Left lower leg: No edema.   Skin:     General: Skin is warm and dry.   Neurological:      General: No focal deficit present.      Mental Status: He is alert and oriented to person, place, and time.   Psychiatric:         Mood and Affect: Mood normal.          Laboratory:  CBC:  Recent Labs   Lab 07/20/22 1905   WBC 11.57 H   RBC 4.85   Hemoglobin 14.7   Hematocrit 42.2   Platelet Count 313   MCV 87.0   MCH 30.3   MCHC 34.8     CMP:  Recent Labs   Lab 07/20/22 1905   Glucose " 105   Calcium 9.0   Albumin 3.6   Total Protein 7.7   Sodium 144   Potassium 3.2 L   CO2 26   Chloride 111 H   BUN 7   Alk Phos 130 H   ALT 64 H   AST 22   Bilirubin, Total 0.5     LIPIDS:      TSH:      A1C:        Assessment/Plan     Sha Goldberg is a 26 y.o.male with:     1. Cough  - X-Ray Chest PA And Lateral; Future    2. Mild intermittent reactive airway disease without complication  - Discussed likely reactive airway disease vs GERD  - Trial of albuterol PRN; encouraged BID dosing for the next 1-2 weeks to see if he gets any improvement in symptoms     Total time spent face-to-face and non-face-to-face coordinating care for this encounter was: 30 min    Chronic conditions status updated as per HPI.  Other than changes above, cont current medications and maintain follow up with specialists.  Return to clinic in 2 weeks.    DO Saravanan EscalanteEncompass [facility]

## 2022-07-25 ENCOUNTER — TELEPHONE (OUTPATIENT)
Dept: FAMILY MEDICINE | Facility: CLINIC | Age: 27
End: 2022-07-25
Payer: COMMERCIAL

## 2022-07-25 NOTE — TELEPHONE ENCOUNTER
----- Message from Garry Limon DO sent at 7/21/2022  7:17 AM CDT -----  No acute issues noted on CXR

## 2022-08-29 ENCOUNTER — OFFICE VISIT (OUTPATIENT)
Dept: FAMILY MEDICINE | Facility: CLINIC | Age: 27
End: 2022-08-29
Payer: COMMERCIAL

## 2022-08-29 VITALS
RESPIRATION RATE: 18 BRPM | HEIGHT: 70 IN | SYSTOLIC BLOOD PRESSURE: 134 MMHG | WEIGHT: 220 LBS | BODY MASS INDEX: 31.5 KG/M2 | TEMPERATURE: 98 F | HEART RATE: 92 BPM | DIASTOLIC BLOOD PRESSURE: 84 MMHG | OXYGEN SATURATION: 99 %

## 2022-08-29 DIAGNOSIS — J20.9 ACUTE BRONCHITIS, UNSPECIFIED ORGANISM: Primary | ICD-10-CM

## 2022-08-29 DIAGNOSIS — R05.1 ACUTE COUGH: ICD-10-CM

## 2022-08-29 PROCEDURE — 1159F PR MEDICATION LIST DOCUMENTED IN MEDICAL RECORD: ICD-10-PCS | Mod: ,,, | Performed by: NURSE PRACTITIONER

## 2022-08-29 PROCEDURE — 1159F MED LIST DOCD IN RCRD: CPT | Mod: ,,, | Performed by: NURSE PRACTITIONER

## 2022-08-29 PROCEDURE — 99213 PR OFFICE/OUTPT VISIT, EST, LEVL III, 20-29 MIN: ICD-10-PCS | Mod: ,,, | Performed by: NURSE PRACTITIONER

## 2022-08-29 PROCEDURE — 3008F BODY MASS INDEX DOCD: CPT | Mod: ,,, | Performed by: NURSE PRACTITIONER

## 2022-08-29 PROCEDURE — 3079F DIAST BP 80-89 MM HG: CPT | Mod: ,,, | Performed by: NURSE PRACTITIONER

## 2022-08-29 PROCEDURE — 3008F PR BODY MASS INDEX (BMI) DOCUMENTED: ICD-10-PCS | Mod: ,,, | Performed by: NURSE PRACTITIONER

## 2022-08-29 PROCEDURE — 3079F PR MOST RECENT DIASTOLIC BLOOD PRESSURE 80-89 MM HG: ICD-10-PCS | Mod: ,,, | Performed by: NURSE PRACTITIONER

## 2022-08-29 PROCEDURE — 99213 OFFICE O/P EST LOW 20 MIN: CPT | Mod: ,,, | Performed by: NURSE PRACTITIONER

## 2022-08-29 PROCEDURE — 3075F PR MOST RECENT SYSTOLIC BLOOD PRESS GE 130-139MM HG: ICD-10-PCS | Mod: ,,, | Performed by: NURSE PRACTITIONER

## 2022-08-29 PROCEDURE — 3075F SYST BP GE 130 - 139MM HG: CPT | Mod: ,,, | Performed by: NURSE PRACTITIONER

## 2022-08-29 RX ORDER — PREDNISONE 20 MG/1
40 TABLET ORAL DAILY
Qty: 10 TABLET | Refills: 0 | Status: SHIPPED | OUTPATIENT
Start: 2022-08-29 | End: 2022-09-03

## 2022-08-29 RX ORDER — CHLOPHEDIANOL HCL AND PYRILAMINE MALEATE 12.5; 12.5 MG/5ML; MG/5ML
10 SOLUTION ORAL 3 TIMES DAILY
Qty: 200 ML | Refills: 0 | Status: SHIPPED | OUTPATIENT
Start: 2022-08-29 | End: 2022-09-03

## 2022-08-29 RX ORDER — ALBUTEROL SULFATE 90 UG/1
2 AEROSOL, METERED RESPIRATORY (INHALATION) EVERY 6 HOURS PRN
Qty: 18 G | Refills: 0 | Status: SHIPPED | OUTPATIENT
Start: 2022-08-29 | End: 2022-10-25

## 2022-08-29 RX ORDER — AZITHROMYCIN 250 MG/1
250 TABLET, FILM COATED ORAL DAILY
Qty: 6 TABLET | Refills: 0 | Status: SHIPPED | OUTPATIENT
Start: 2022-08-29 | End: 2022-09-03

## 2022-08-29 NOTE — PROGRESS NOTES
Subjective:       Patient ID: Sha Goldberg is a 26 y.o. male.    Chief Complaint: Cough (Cough since covid 7 months ago)    Cough    Cough  Pertinent negatives include no chest pain, ear pain, fever, headaches, rash, sore throat or shortness of breath.   Review of Systems   Constitutional:  Negative for appetite change, fatigue and fever.   HENT:  Negative for nasal congestion, ear pain and sore throat.    Eyes:  Negative for pain, discharge and itching.   Respiratory:  Positive for cough. Negative for shortness of breath.    Cardiovascular:  Negative for chest pain and leg swelling.   Gastrointestinal:  Negative for abdominal pain, change in bowel habit, nausea, vomiting and change in bowel habit.   Musculoskeletal:  Negative for back pain, gait problem and neck pain.   Integumentary:  Negative for rash and wound.   Neurological:  Negative for dizziness, weakness and headaches.   All other systems reviewed and are negative.      Objective:      Physical Exam  Vitals and nursing note reviewed.   Constitutional:       General: He is not in acute distress.     Appearance: Normal appearance. He is not ill-appearing, toxic-appearing or diaphoretic.   HENT:      Head: Normocephalic.      Right Ear: Tympanic membrane, ear canal and external ear normal.      Left Ear: Tympanic membrane, ear canal and external ear normal.      Nose: Nose normal. No congestion or rhinorrhea.      Mouth/Throat:      Mouth: Mucous membranes are moist.      Pharynx: Posterior oropharyngeal erythema present. No oropharyngeal exudate.   Eyes:      General: No scleral icterus.        Right eye: No discharge.         Left eye: No discharge.      Extraocular Movements: Extraocular movements intact.      Conjunctiva/sclera: Conjunctivae normal.      Pupils: Pupils are equal, round, and reactive to light.   Cardiovascular:      Rate and Rhythm: Normal rate and regular rhythm.      Pulses: Normal pulses.      Heart sounds: Normal heart sounds. No  murmur heard.  Pulmonary:      Effort: Pulmonary effort is normal. No respiratory distress.      Breath sounds: No wheezing, rhonchi or rales.      Comments: Decreased breath sounds  Musculoskeletal:         General: Normal range of motion.      Cervical back: Neck supple. No tenderness.   Lymphadenopathy:      Cervical: No cervical adenopathy.   Skin:     General: Skin is warm and dry.      Capillary Refill: Capillary refill takes less than 2 seconds.      Findings: No rash.   Neurological:      Mental Status: He is alert and oriented to person, place, and time.   Psychiatric:         Mood and Affect: Mood normal.         Behavior: Behavior normal.         Thought Content: Thought content normal.         Judgment: Judgment normal.          Assessment:       1. Acute cough    2. Acute bronchitis, unspecified organism          Plan:   Acute cough  -     X-Ray Chest PA And Lateral; Future; Expected date: 08/29/2022    Acute bronchitis, unspecified organism  -     azithromycin (ZITHROMAX Z-UMU) 250 MG tablet; Take 1 tablet (250 mg total) by mouth once daily. Take 2 tablets on day 1 and then take 1 tablet days 2-5 for 5 days  Dispense: 6 tablet; Refill: 0  -     predniSONE (DELTASONE) 20 MG tablet; Take 2 tablets (40 mg total) by mouth once daily. for 5 days  Dispense: 10 tablet; Refill: 0  -     pyrilamine-chlophedianoL (NINJACOF) 12.5-12.5 mg/5 mL Liqd; Take 10 mLs by mouth 3 (three) times daily. for 5 days  Dispense: 200 mL; Refill: 0  -     albuterol (PROAIR HFA) 90 mcg/actuation inhaler; Inhale 2 puffs into the lungs every 6 (six) hours as needed for Wheezing. Rescue  Dispense: 18 g; Refill: 0

## 2022-08-29 NOTE — LETTER
August 29, 2022      [facility]  [address] [address]  Phone: [phone]  Fax: [phone]       Patient: Sha Goldberg   YOB: 1995  Date of Visit: 08/29/2022    To Whom It May Concern:    Mila Goldberg  was at [facility] on 08/29/2022. The patient may return to work/school on 08/30/2022 without restrictions. If you have any questions or concerns, or if I can be of further assistance, please do not hesitate to contact me.    Sincerely,    GERARDO Hernandez

## 2022-10-25 ENCOUNTER — OFFICE VISIT (OUTPATIENT)
Dept: FAMILY MEDICINE | Facility: CLINIC | Age: 27
End: 2022-10-25
Payer: COMMERCIAL

## 2022-10-25 VITALS
OXYGEN SATURATION: 98 % | DIASTOLIC BLOOD PRESSURE: 97 MMHG | SYSTOLIC BLOOD PRESSURE: 127 MMHG | BODY MASS INDEX: 36.29 KG/M2 | HEART RATE: 105 BPM | TEMPERATURE: 99 F | HEIGHT: 69 IN | WEIGHT: 245 LBS

## 2022-10-25 DIAGNOSIS — Z20.828 EXPOSURE TO VIRAL DISEASE: Primary | ICD-10-CM

## 2022-10-25 DIAGNOSIS — J02.9 ACUTE PHARYNGITIS, UNSPECIFIED ETIOLOGY: ICD-10-CM

## 2022-10-25 LAB
CTP QC/QA: YES
CTP QC/QA: YES
FLUAV AG NPH QL: NEGATIVE
FLUBV AG NPH QL: NEGATIVE
S PYO RRNA THROAT QL PROBE: NEGATIVE

## 2022-10-25 PROCEDURE — 3074F SYST BP LT 130 MM HG: CPT | Mod: ,,, | Performed by: NURSE PRACTITIONER

## 2022-10-25 PROCEDURE — 87804 POCT INFLUENZA A/B: ICD-10-PCS | Mod: QW,,, | Performed by: NURSE PRACTITIONER

## 2022-10-25 PROCEDURE — 87804 INFLUENZA ASSAY W/OPTIC: CPT | Mod: QW,,, | Performed by: NURSE PRACTITIONER

## 2022-10-25 PROCEDURE — 87880 STREP A ASSAY W/OPTIC: CPT | Mod: QW,,, | Performed by: NURSE PRACTITIONER

## 2022-10-25 PROCEDURE — 1159F MED LIST DOCD IN RCRD: CPT | Mod: ,,, | Performed by: NURSE PRACTITIONER

## 2022-10-25 PROCEDURE — 3080F DIAST BP >= 90 MM HG: CPT | Mod: ,,, | Performed by: NURSE PRACTITIONER

## 2022-10-25 PROCEDURE — 99213 OFFICE O/P EST LOW 20 MIN: CPT | Mod: ,,, | Performed by: NURSE PRACTITIONER

## 2022-10-25 PROCEDURE — 87880 POCT RAPID STREP A: ICD-10-PCS | Mod: QW,,, | Performed by: NURSE PRACTITIONER

## 2022-10-25 PROCEDURE — 3074F PR MOST RECENT SYSTOLIC BLOOD PRESSURE < 130 MM HG: ICD-10-PCS | Mod: ,,, | Performed by: NURSE PRACTITIONER

## 2022-10-25 PROCEDURE — 99213 PR OFFICE/OUTPT VISIT, EST, LEVL III, 20-29 MIN: ICD-10-PCS | Mod: ,,, | Performed by: NURSE PRACTITIONER

## 2022-10-25 PROCEDURE — 1159F PR MEDICATION LIST DOCUMENTED IN MEDICAL RECORD: ICD-10-PCS | Mod: ,,, | Performed by: NURSE PRACTITIONER

## 2022-10-25 PROCEDURE — 3080F PR MOST RECENT DIASTOLIC BLOOD PRESSURE >= 90 MM HG: ICD-10-PCS | Mod: ,,, | Performed by: NURSE PRACTITIONER

## 2022-10-25 RX ORDER — AZITHROMYCIN 250 MG/1
250 TABLET, FILM COATED ORAL DAILY
Qty: 6 TABLET | Refills: 0 | Status: SHIPPED | OUTPATIENT
Start: 2022-10-25 | End: 2022-10-30

## 2022-10-25 RX ORDER — OSELTAMIVIR PHOSPHATE 75 MG/1
75 CAPSULE ORAL 2 TIMES DAILY
Qty: 10 CAPSULE | Refills: 0 | Status: SHIPPED | OUTPATIENT
Start: 2022-10-25 | End: 2022-10-30

## 2022-10-25 NOTE — PROGRESS NOTES
Subjective:       Patient ID: Sha Goldberg is a 27 y.o. male.    Chief Complaint: Cough and Sore Throat    Cough and sore throat    Cough  Associated symptoms include a sore throat. Pertinent negatives include no chest pain, ear pain, fever, headaches, rash or shortness of breath.   Sore Throat   Associated symptoms include coughing. Pertinent negatives include no abdominal pain, congestion, ear pain, headaches, neck pain, shortness of breath or vomiting.   Review of Systems   Constitutional:  Negative for appetite change, fatigue and fever.   HENT:  Positive for sore throat. Negative for nasal congestion and ear pain.    Eyes:  Negative for pain, discharge and itching.   Respiratory:  Positive for cough. Negative for shortness of breath.    Cardiovascular:  Negative for chest pain and leg swelling.   Gastrointestinal:  Negative for abdominal pain, change in bowel habit, nausea, vomiting and change in bowel habit.   Musculoskeletal:  Negative for back pain, gait problem and neck pain.   Integumentary:  Negative for rash and wound.   Neurological:  Negative for dizziness, weakness and headaches.   All other systems reviewed and are negative.      Objective:      Physical Exam  Vitals and nursing note reviewed.   Constitutional:       General: He is not in acute distress.     Appearance: Normal appearance. He is not ill-appearing, toxic-appearing or diaphoretic.   HENT:      Head: Normocephalic.      Right Ear: Tympanic membrane, ear canal and external ear normal.      Left Ear: Tympanic membrane, ear canal and external ear normal.      Nose: Congestion present. No rhinorrhea.      Mouth/Throat:      Mouth: Mucous membranes are moist.      Pharynx: Posterior oropharyngeal erythema present. No oropharyngeal exudate.   Eyes:      General: No scleral icterus.        Right eye: No discharge.         Left eye: No discharge.      Extraocular Movements: Extraocular movements intact.      Conjunctiva/sclera:  Conjunctivae normal.      Pupils: Pupils are equal, round, and reactive to light.   Cardiovascular:      Rate and Rhythm: Normal rate and regular rhythm.      Pulses: Normal pulses.      Heart sounds: Normal heart sounds. No murmur heard.  Pulmonary:      Effort: Pulmonary effort is normal. No respiratory distress.      Breath sounds: Normal breath sounds. No wheezing, rhonchi or rales.   Musculoskeletal:         General: Normal range of motion.      Cervical back: Neck supple. No tenderness.   Lymphadenopathy:      Cervical: No cervical adenopathy.   Skin:     General: Skin is warm and dry.      Capillary Refill: Capillary refill takes less than 2 seconds.      Findings: No rash.   Neurological:      Mental Status: He is alert and oriented to person, place, and time.   Psychiatric:         Mood and Affect: Mood normal.         Behavior: Behavior normal.         Thought Content: Thought content normal.         Judgment: Judgment normal.          Assessment:       1. Exposure to viral disease    2. Acute pharyngitis, unspecified etiology          Plan:   Exposure to viral disease  -     POCT Influenza A/B  -     oseltamivir (TAMIFLU) 75 MG capsule; Take 1 capsule (75 mg total) by mouth 2 (two) times daily. for 5 days  Dispense: 10 capsule; Refill: 0    Acute pharyngitis, unspecified etiology  -     POCT rapid strep A  -     azithromycin (ZITHROMAX Z-UMU) 250 MG tablet; Take 1 tablet (250 mg total) by mouth once daily. Take 2 tablets on day 1 and then take 1 tablet days 2-5 for 5 days  Dispense: 6 tablet; Refill: 0

## 2022-10-25 NOTE — LETTER
October 25, 2022      [facility]  [address] [address]  Phone: [phone]  Fax: [phone]       Patient: Sha Goldberg   YOB: 1995  Date of Visit: 10/25/2022    To Whom It May Concern:    Mila Goldberg  was at [facility] on 10/25/2022. The patient may return to work/school on 10/28/2022 without restrictions. If you have any questions or concerns, or if I can be of further assistance, please do not hesitate to contact me.    Sincerely,    GERARDO Hernandez

## 2023-06-25 ENCOUNTER — OFFICE VISIT (OUTPATIENT)
Dept: FAMILY MEDICINE | Facility: CLINIC | Age: 28
End: 2023-06-25
Payer: COMMERCIAL

## 2023-06-25 VITALS
SYSTOLIC BLOOD PRESSURE: 113 MMHG | DIASTOLIC BLOOD PRESSURE: 76 MMHG | WEIGHT: 217.38 LBS | RESPIRATION RATE: 18 BRPM | BODY MASS INDEX: 32.2 KG/M2 | OXYGEN SATURATION: 97 % | HEIGHT: 69 IN | TEMPERATURE: 98 F | HEART RATE: 76 BPM

## 2023-06-25 DIAGNOSIS — R55 SYNCOPE, UNSPECIFIED SYNCOPE TYPE: Primary | ICD-10-CM

## 2023-06-25 LAB
ALBUMIN SERPL BCP-MCNC: 3.8 G/DL (ref 3.5–5)
ALBUMIN/GLOB SERPL: 1.2 {RATIO}
ALP SERPL-CCNC: 104 U/L (ref 45–115)
ALT SERPL W P-5'-P-CCNC: 28 U/L (ref 16–61)
ANION GAP SERPL CALCULATED.3IONS-SCNC: 7 MMOL/L (ref 7–16)
AST SERPL W P-5'-P-CCNC: 16 U/L (ref 15–37)
BASOPHILS # BLD AUTO: 0.04 K/UL (ref 0–0.2)
BASOPHILS NFR BLD AUTO: 0.5 % (ref 0–1)
BILIRUB SERPL-MCNC: 0.4 MG/DL (ref ?–1.2)
BUN SERPL-MCNC: 8 MG/DL (ref 7–18)
BUN/CREAT SERPL: 8 (ref 6–20)
CALCIUM SERPL-MCNC: 8.9 MG/DL (ref 8.5–10.1)
CHLORIDE SERPL-SCNC: 109 MMOL/L (ref 98–107)
CO2 SERPL-SCNC: 30 MMOL/L (ref 21–32)
CREAT SERPL-MCNC: 0.98 MG/DL (ref 0.7–1.3)
DIFFERENTIAL METHOD BLD: ABNORMAL
EGFR (NO RACE VARIABLE) (RUSH/TITUS): 110 ML/MIN/1.73M2
EOSINOPHIL # BLD AUTO: 0.18 K/UL (ref 0–0.5)
EOSINOPHIL NFR BLD AUTO: 2.4 % (ref 1–4)
ERYTHROCYTE [DISTWIDTH] IN BLOOD BY AUTOMATED COUNT: 13 % (ref 11.5–14.5)
EST. AVERAGE GLUCOSE BLD GHB EST-MCNC: 81 MG/DL
GLOBULIN SER-MCNC: 3.3 G/DL (ref 2–4)
GLUCOSE SERPL-MCNC: 91 MG/DL (ref 74–106)
HBA1C MFR BLD HPLC: 5 % (ref 4.5–6.6)
HCT VFR BLD AUTO: 45.7 % (ref 40–54)
HGB BLD-MCNC: 14.9 G/DL (ref 13.5–18)
IMM GRANULOCYTES # BLD AUTO: 0.03 K/UL (ref 0–0.04)
IMM GRANULOCYTES NFR BLD: 0.4 % (ref 0–0.4)
LYMPHOCYTES # BLD AUTO: 2.26 K/UL (ref 1–4.8)
LYMPHOCYTES NFR BLD AUTO: 29.7 % (ref 27–41)
MCH RBC QN AUTO: 29 PG (ref 27–31)
MCHC RBC AUTO-ENTMCNC: 32.6 G/DL (ref 32–36)
MCV RBC AUTO: 89.1 FL (ref 80–96)
MONOCYTES # BLD AUTO: 0.48 K/UL (ref 0–0.8)
MONOCYTES NFR BLD AUTO: 6.3 % (ref 2–6)
MPC BLD CALC-MCNC: 10.6 FL (ref 9.4–12.4)
NEUTROPHILS # BLD AUTO: 4.63 K/UL (ref 1.8–7.7)
NEUTROPHILS NFR BLD AUTO: 60.7 % (ref 53–65)
NRBC # BLD AUTO: 0 X10E3/UL
NRBC, AUTO (.00): 0 %
PLATELET # BLD AUTO: 289 K/UL (ref 150–400)
POTASSIUM SERPL-SCNC: 4.3 MMOL/L (ref 3.5–5.1)
PROT SERPL-MCNC: 7.1 G/DL (ref 6.4–8.2)
RBC # BLD AUTO: 5.13 M/UL (ref 4.6–6.2)
SODIUM SERPL-SCNC: 142 MMOL/L (ref 136–145)
TSH SERPL DL<=0.005 MIU/L-ACNC: 1.24 UIU/ML (ref 0.36–3.74)
VIT B12 SERPL-MCNC: 287 PG/ML (ref 193–986)
WBC # BLD AUTO: 7.62 K/UL (ref 4.5–11)

## 2023-06-25 PROCEDURE — 80050 PR  GENERAL HEALTH PANEL: ICD-10-PCS | Mod: ,,, | Performed by: CLINICAL MEDICAL LABORATORY

## 2023-06-25 PROCEDURE — 82607 VITAMIN B12: ICD-10-PCS | Mod: ,,, | Performed by: CLINICAL MEDICAL LABORATORY

## 2023-06-25 PROCEDURE — 99214 PR OFFICE/OUTPT VISIT, EST, LEVL IV, 30-39 MIN: ICD-10-PCS | Mod: ,,, | Performed by: FAMILY MEDICINE

## 2023-06-25 PROCEDURE — 83036 HEMOGLOBIN A1C: ICD-10-PCS | Mod: ,,, | Performed by: CLINICAL MEDICAL LABORATORY

## 2023-06-25 PROCEDURE — 3074F SYST BP LT 130 MM HG: CPT | Mod: ,,, | Performed by: FAMILY MEDICINE

## 2023-06-25 PROCEDURE — 3044F PR MOST RECENT HEMOGLOBIN A1C LEVEL <7.0%: ICD-10-PCS | Mod: ,,, | Performed by: FAMILY MEDICINE

## 2023-06-25 PROCEDURE — 3074F PR MOST RECENT SYSTOLIC BLOOD PRESSURE < 130 MM HG: ICD-10-PCS | Mod: ,,, | Performed by: FAMILY MEDICINE

## 2023-06-25 PROCEDURE — 99051 MED SERV EVE/WKEND/HOLIDAY: CPT | Mod: ,,, | Performed by: FAMILY MEDICINE

## 2023-06-25 PROCEDURE — 99051 PR MEDICAL SERVICES, EVE/WKEND/HOLIDAY: ICD-10-PCS | Mod: ,,, | Performed by: FAMILY MEDICINE

## 2023-06-25 PROCEDURE — 83036 HEMOGLOBIN GLYCOSYLATED A1C: CPT | Mod: ,,, | Performed by: CLINICAL MEDICAL LABORATORY

## 2023-06-25 PROCEDURE — 80050 GENERAL HEALTH PANEL: CPT | Mod: ,,, | Performed by: CLINICAL MEDICAL LABORATORY

## 2023-06-25 PROCEDURE — 1159F MED LIST DOCD IN RCRD: CPT | Mod: ,,, | Performed by: FAMILY MEDICINE

## 2023-06-25 PROCEDURE — 3008F PR BODY MASS INDEX (BMI) DOCUMENTED: ICD-10-PCS | Mod: ,,, | Performed by: FAMILY MEDICINE

## 2023-06-25 PROCEDURE — 1159F PR MEDICATION LIST DOCUMENTED IN MEDICAL RECORD: ICD-10-PCS | Mod: ,,, | Performed by: FAMILY MEDICINE

## 2023-06-25 PROCEDURE — 3078F DIAST BP <80 MM HG: CPT | Mod: ,,, | Performed by: FAMILY MEDICINE

## 2023-06-25 PROCEDURE — 82607 VITAMIN B-12: CPT | Mod: ,,, | Performed by: CLINICAL MEDICAL LABORATORY

## 2023-06-25 PROCEDURE — 99214 OFFICE O/P EST MOD 30 MIN: CPT | Mod: ,,, | Performed by: FAMILY MEDICINE

## 2023-06-25 PROCEDURE — 1160F RVW MEDS BY RX/DR IN RCRD: CPT | Mod: ,,, | Performed by: FAMILY MEDICINE

## 2023-06-25 PROCEDURE — 1160F PR REVIEW ALL MEDS BY PRESCRIBER/CLIN PHARMACIST DOCUMENTED: ICD-10-PCS | Mod: ,,, | Performed by: FAMILY MEDICINE

## 2023-06-25 PROCEDURE — 3044F HG A1C LEVEL LT 7.0%: CPT | Mod: ,,, | Performed by: FAMILY MEDICINE

## 2023-06-25 PROCEDURE — 3078F PR MOST RECENT DIASTOLIC BLOOD PRESSURE < 80 MM HG: ICD-10-PCS | Mod: ,,, | Performed by: FAMILY MEDICINE

## 2023-06-25 PROCEDURE — 3008F BODY MASS INDEX DOCD: CPT | Mod: ,,, | Performed by: FAMILY MEDICINE

## 2023-06-25 NOTE — PROGRESS NOTES
Subjective:       Patient ID: Sha Goldberg is a 27 y.o. male.    Chief Complaint: Dizziness (Has had two nearly fainting spells.) and Hypertension (The highest was 155/80.)    Pt had 2 near syncopal episodes over past 2 days. Pt reports change in diet, is on atkins diet over past 3 months. Started feeling fatigued since changing diet.     Review of Systems   Constitutional:  Negative for activity change, appetite change, chills, diaphoresis, fatigue, fever and unexpected weight change.   HENT:  Negative for nasal congestion, dental problem, drooling, ear discharge, ear pain, facial swelling, hearing loss, mouth sores, nosebleeds, postnasal drip, rhinorrhea, sinus pressure/congestion, sneezing, sore throat, tinnitus, trouble swallowing, voice change and goiter.    Eyes:  Negative for photophobia, pain, discharge, redness, itching and visual disturbance.   Respiratory:  Negative for apnea, cough, choking, chest tightness, shortness of breath, wheezing and stridor.    Cardiovascular:  Negative for chest pain, palpitations, leg swelling and claudication.   Gastrointestinal:  Negative for abdominal distention, abdominal pain, anal bleeding, blood in stool, change in bowel habit, constipation, diarrhea, nausea, vomiting, reflux, fecal incontinence and change in bowel habit.   Endocrine: Negative for cold intolerance, heat intolerance, polydipsia, polyphagia and polyuria.   Genitourinary:  Negative for bladder incontinence, decreased urine volume, difficulty urinating, discharge, dysuria, enuresis, erectile dysfunction, flank pain, frequency, genital sores, hematuria, penile pain, testicular pain and urgency.   Musculoskeletal:  Negative for arthralgias, back pain, gait problem, joint swelling, leg pain, myalgias, neck pain, neck stiffness and joint deformity.   Integumentary:  Negative for pallor, rash, wound and mole/lesion.   Allergic/Immunologic: Negative for environmental allergies, food allergies and frequent  infections.   Neurological:  Positive for syncope and light-headedness. Negative for dizziness, vertigo, tremors, seizures, facial asymmetry, speech difficulty, weakness, numbness, headaches, coordination difficulties, memory loss and coordination difficulties.   Hematological:  Negative for adenopathy. Does not bruise/bleed easily.   Psychiatric/Behavioral:  Negative for agitation, behavioral problems, confusion, decreased concentration, dysphoric mood, hallucinations, self-injury, sleep disturbance and suicidal ideas. The patient is not nervous/anxious and is not hyperactive.        Objective:      Physical Exam  Vitals reviewed.   Constitutional:       Appearance: Normal appearance. He is normal weight.   HENT:      Head: Normocephalic and atraumatic.      Right Ear: Tympanic membrane and ear canal normal.      Left Ear: Tympanic membrane, ear canal and external ear normal.      Nose: Nose normal.      Mouth/Throat:      Mouth: Mucous membranes are moist.      Pharynx: Oropharynx is clear.   Eyes:      Extraocular Movements: Extraocular movements intact.      Conjunctiva/sclera: Conjunctivae normal.      Pupils: Pupils are equal, round, and reactive to light.   Cardiovascular:      Rate and Rhythm: Normal rate and regular rhythm.      Pulses: Normal pulses.      Heart sounds: Normal heart sounds.   Pulmonary:      Effort: Pulmonary effort is normal.      Breath sounds: Normal breath sounds.   Abdominal:      General: Abdomen is flat. Bowel sounds are normal.      Palpations: Abdomen is soft.   Musculoskeletal:         General: Normal range of motion.      Cervical back: Normal range of motion and neck supple.   Skin:     General: Skin is warm and dry.   Neurological:      General: No focal deficit present.      Mental Status: He is alert and oriented to person, place, and time. Mental status is at baseline.   Psychiatric:         Mood and Affect: Mood normal.         Behavior: Behavior normal.         Thought  Content: Thought content normal.         Judgment: Judgment normal.       Assessment:       1. Syncope, unspecified syncope type        Plan:     Syncope, unspecified syncope type  -     Hemoglobin A1C; Future; Expected date: 06/25/2023  -     CBC Auto Differential; Future; Expected date: 06/25/2023  -     Comprehensive Metabolic Panel; Future; Expected date: 06/25/2023  -     TSH; Future; Expected date: 06/25/2023  -     Vitamin B12; Future; Expected date: 06/25/2023       Likely episodes related to atkins diet, will check labs, will encourage switch to regular diet.

## 2023-07-23 ENCOUNTER — HOSPITAL ENCOUNTER (EMERGENCY)
Facility: HOSPITAL | Age: 28
Discharge: HOME | End: 2023-07-23
Attending: EMERGENCY MEDICINE
Payer: COMMERCIAL

## 2023-07-23 VITALS
WEIGHT: 211 LBS | OXYGEN SATURATION: 98 % | SYSTOLIC BLOOD PRESSURE: 131 MMHG | TEMPERATURE: 98 F | HEIGHT: 69 IN | RESPIRATION RATE: 18 BRPM | DIASTOLIC BLOOD PRESSURE: 86 MMHG | HEART RATE: 81 BPM | BODY MASS INDEX: 31.25 KG/M2

## 2023-07-23 DIAGNOSIS — R42 LIGHTHEADEDNESS: Primary | ICD-10-CM

## 2023-07-23 PROCEDURE — 99283 PR EMERGENCY DEPT VISIT,LEVEL III: ICD-10-PCS | Mod: ,,, | Performed by: EMERGENCY MEDICINE

## 2023-07-23 PROCEDURE — 99283 EMERGENCY DEPT VISIT LOW MDM: CPT | Mod: ,,, | Performed by: EMERGENCY MEDICINE

## 2023-07-23 PROCEDURE — 99282 EMERGENCY DEPT VISIT SF MDM: CPT

## 2023-07-23 NOTE — Clinical Note
Lita Goldberg accompanied their child to the emergency department on 7/23/2023. They may return to work on 07/24/2023.      If you have any questions or concerns, please don't hesitate to call.       RN

## 2023-07-24 ENCOUNTER — TELEPHONE (OUTPATIENT)
Dept: EMERGENCY MEDICINE | Facility: HOSPITAL | Age: 28
End: 2023-07-24
Payer: COMMERCIAL

## 2023-07-24 NOTE — ED PROVIDER NOTES
Encounter Date: 7/23/2023    SCRIBE #1 NOTE: I, Isamar Armond, am scribing for, and in the presence of,  Hay Summers MD. I have scribed the entire note.     History     Chief Complaint   Patient presents with    Dizziness     Pt states onset today at work - pt states on the way home from work he started to have a headache - pt denies any pain at time of triage     Patient is a 27 y.o. male who presents to the emergency department due to complaints of light-headedness. Patient explains that at around 17:30 while at work, he began to feel lightheaded and then developed a headache. He also reports heart palpitations. Patient notes that symptoms have now resolved. He denies any recent diarrhea, vomiting, or leg swelling. No other symptoms were reported.    The history is provided by the patient. No  was used.   Review of patient's allergies indicates:  No Known Allergies  Past Medical History:   Diagnosis Date    Acne     Hypertension      History reviewed. No pertinent surgical history.  History reviewed. No pertinent family history.  Social History     Tobacco Use    Smoking status: Never    Smokeless tobacco: Never   Substance Use Topics    Alcohol use: Never    Drug use: Never     Review of Systems   Eyes: Negative.    Cardiovascular:  Positive for palpitations. Negative for leg swelling.   Gastrointestinal:  Negative for diarrhea and vomiting.   Endocrine: Negative.    Genitourinary: Negative.    Musculoskeletal: Negative.    Skin: Negative.    Allergic/Immunologic: Negative.    Neurological:  Positive for light-headedness and headaches.   Hematological: Negative.    Psychiatric/Behavioral: Negative.     All other systems reviewed and are negative.    Physical Exam     Initial Vitals [07/23/23 1948]   BP Pulse Resp Temp SpO2   131/86 81 18 98.4 °F (36.9 °C) 98 %      MAP       --         Physical Exam    Nursing note and vitals reviewed.  Constitutional: He appears well-developed and  well-nourished.   HENT:   Head: Normocephalic and atraumatic.   Cardiovascular:  Normal rate, regular rhythm and normal heart sounds.           Pulmonary/Chest: Breath sounds normal.   Abdominal: Abdomen is soft. Bowel sounds are normal.     Neurological: He is alert and oriented to person, place, and time.   Skin: Skin is warm and dry.   Psychiatric: He has a normal mood and affect. Thought content normal.       ED Course   Procedures  Labs Reviewed - No data to display       Imaging Results    None          Medications - No data to display             Attending Attestation:           Physician Attestation for Scribe:  Physician Attestation Statement for Scribe #1: I, Hay Summers MD, reviewed documentation, as scribed by Isamar Leahy in my presence, and it is both accurate and complete.           ED Course as of 07/24/23 0203   Sun Jul 23, 2023 2010 Medical decision-making:  Differential diagnosis includes generalized weakness, dehydration, hypoglycemia, anxiety.  No labs or imaging were performed on this patient. [BB]      ED Course User Index  [BB] Hay Summers MD                 Clinical Impression:   Final diagnoses:  [R42] Lightheadedness (Primary)        ED Disposition Condition    Discharge Stable          ED Prescriptions    None       Follow-up Information    None          Hay Summers MD  07/24/23 0203

## 2023-07-24 NOTE — DISCHARGE INSTRUCTIONS
Rest and eat a good meal tonight and drink plenty of liquids.  Return to emergency department for any worsening or further problems.  Follow up in clinic with primary care provider in 2-3 days for recheck if symptoms persist.

## 2023-12-14 ENCOUNTER — OFFICE VISIT (OUTPATIENT)
Dept: OTOLARYNGOLOGY | Facility: CLINIC | Age: 28
End: 2023-12-14
Payer: COMMERCIAL

## 2023-12-14 VITALS — WEIGHT: 194 LBS | BODY MASS INDEX: 30.45 KG/M2 | HEIGHT: 67 IN

## 2023-12-14 DIAGNOSIS — H90.2 CONDUCTIVE HEARING LOSS, UNSPECIFIED LATERALITY: ICD-10-CM

## 2023-12-14 DIAGNOSIS — H61.23 BILATERAL IMPACTED CERUMEN: Primary | ICD-10-CM

## 2023-12-14 PROCEDURE — 99213 OFFICE O/P EST LOW 20 MIN: CPT | Mod: PBBFAC | Performed by: OTOLARYNGOLOGY

## 2023-12-14 PROCEDURE — 3008F BODY MASS INDEX DOCD: CPT | Mod: ,,, | Performed by: OTOLARYNGOLOGY

## 2023-12-14 PROCEDURE — 1160F PR REVIEW ALL MEDS BY PRESCRIBER/CLIN PHARMACIST DOCUMENTED: ICD-10-PCS | Mod: ,,, | Performed by: OTOLARYNGOLOGY

## 2023-12-14 PROCEDURE — 3008F PR BODY MASS INDEX (BMI) DOCUMENTED: ICD-10-PCS | Mod: ,,, | Performed by: OTOLARYNGOLOGY

## 2023-12-14 PROCEDURE — 1160F RVW MEDS BY RX/DR IN RCRD: CPT | Mod: ,,, | Performed by: OTOLARYNGOLOGY

## 2023-12-14 PROCEDURE — 3044F PR MOST RECENT HEMOGLOBIN A1C LEVEL <7.0%: ICD-10-PCS | Mod: ,,, | Performed by: OTOLARYNGOLOGY

## 2023-12-14 PROCEDURE — 99204 OFFICE O/P NEW MOD 45 MIN: CPT | Mod: 25,S$PBB,, | Performed by: OTOLARYNGOLOGY

## 2023-12-14 PROCEDURE — 69210 REMOVE IMPACTED EAR WAX UNI: CPT | Mod: 50,PBBFAC | Performed by: OTOLARYNGOLOGY

## 2023-12-14 PROCEDURE — 3044F HG A1C LEVEL LT 7.0%: CPT | Mod: ,,, | Performed by: OTOLARYNGOLOGY

## 2023-12-14 PROCEDURE — 99204 PR OFFICE/OUTPT VISIT, NEW, LEVL IV, 45-59 MIN: ICD-10-PCS | Mod: 25,S$PBB,, | Performed by: OTOLARYNGOLOGY

## 2023-12-14 PROCEDURE — 69210 PR REMOVAL IMPACTED CERUMEN REQUIRING INSTRUMENTATION, UNILATERAL: ICD-10-PCS | Mod: S$PBB,,, | Performed by: OTOLARYNGOLOGY

## 2023-12-14 PROCEDURE — 1159F PR MEDICATION LIST DOCUMENTED IN MEDICAL RECORD: ICD-10-PCS | Mod: ,,, | Performed by: OTOLARYNGOLOGY

## 2023-12-14 PROCEDURE — 1159F MED LIST DOCD IN RCRD: CPT | Mod: ,,, | Performed by: OTOLARYNGOLOGY

## 2023-12-14 PROCEDURE — 69210 REMOVE IMPACTED EAR WAX UNI: CPT | Mod: S$PBB,,, | Performed by: OTOLARYNGOLOGY

## 2023-12-14 NOTE — PROGRESS NOTES
Subjective:       Patient ID: Sha Goldberg is a 28 y.o. male.    Chief Complaint: Cerumen Impaction (Patient complaining of his ears needing to be cleaned. He complains of his ears itching.)    HPI  Review of Systems   HENT:  Positive for ear pain and hearing loss.    All other systems reviewed and are negative.      Objective:      Physical Exam  General: NAD  Head: Normocephalic, atraumatic, no facial asymmetry/normal strength,  Ears: Both auricules normal in appearance, w/o deformities tympanic membranes normal external auditory canals wax impaction   Nose: External nose w/o deformities normal turbinates no drainage or inflammation  Oral Cavity: Lips, gums, floor of mouth, tongue hard palate, and buccal mucosa without mass/lesion  Oropharynx: Mucosa pink and moist, soft palate, posterior pharynx and oropharyngeal wall without mass/lesion  Neck: Supple, symmetric, trachea midline, no palpable mass/lesion, no palpable cervical lymphadenopathy  Skin: Warm and dry, no concerning lesions  Respiratory: Respirations even, unlabored    Procedure: Binocular microscopy with removal of cerumen impaction using microsurgical instrumentation.  After explaining the procedure and obtaining verbal assent,  each external auditory canal visualized with the 250 mm focal length lens through the operating microscope. The obstructing cerumen was removed with microsurgical instrumentation to reveal normal and healthy external auditory canals. The patient tolerated this procedure well without complication.    Assessment:       1. Bilateral impacted cerumen    2. Conductive hearing loss, unspecified laterality        Plan:       F/u 6 months

## 2024-02-07 ENCOUNTER — OFFICE VISIT (OUTPATIENT)
Dept: FAMILY MEDICINE | Facility: CLINIC | Age: 29
End: 2024-02-07

## 2024-02-07 VITALS
HEART RATE: 100 BPM | RESPIRATION RATE: 19 BRPM | BODY MASS INDEX: 31.39 KG/M2 | DIASTOLIC BLOOD PRESSURE: 80 MMHG | SYSTOLIC BLOOD PRESSURE: 130 MMHG | TEMPERATURE: 99 F | HEIGHT: 67 IN | OXYGEN SATURATION: 99 % | WEIGHT: 200 LBS

## 2024-02-07 DIAGNOSIS — Z01.30 BLOOD PRESSURE CHECK: Primary | ICD-10-CM

## 2024-02-07 PROCEDURE — 99212 OFFICE O/P EST SF 10 MIN: CPT | Mod: ,,, | Performed by: FAMILY MEDICINE

## 2024-02-07 NOTE — PROGRESS NOTES
Subjective     Patient ID: Sha Goldberg is a 28 y.o. male.    Chief Complaint: Health Maintenance ( Sent him to get his BP Checked - Need to Clear of HTN)    No history of hypertension.  Patient said he just needs a letter for the [employer].  He has no history of hypertension      Review of Systems       Objective     Physical Exam  Constitutional:       General: He is not in acute distress.  Cardiovascular:      Rate and Rhythm: Normal rate and regular rhythm.   Pulmonary:      Effort: Pulmonary effort is normal.      Breath sounds: Normal breath sounds.   Musculoskeletal:      Right lower leg: No edema.      Left lower leg: No edema.   Neurological:      Mental Status: He is alert.            Assessment and Plan     1. Blood pressure check        Blood pressure is normal.  Patient has no history of hypertension.  Generally in very good health.         No follow-ups on file.

## 2024-02-07 NOTE — PATIENT INSTRUCTIONS
Patient seen today for blood pressure check.  Blood pressure 130/80.  This is well within normal limits.  He has no history of hypertension.  He is in generally good health.

## 2024-06-18 ENCOUNTER — OFFICE VISIT (OUTPATIENT)
Dept: FAMILY MEDICINE | Facility: CLINIC | Age: 29
End: 2024-06-18

## 2024-06-18 VITALS
HEART RATE: 88 BPM | BODY MASS INDEX: 32.8 KG/M2 | OXYGEN SATURATION: 97 % | HEIGHT: 67 IN | WEIGHT: 209 LBS | TEMPERATURE: 98 F | RESPIRATION RATE: 16 BRPM | SYSTOLIC BLOOD PRESSURE: 129 MMHG | DIASTOLIC BLOOD PRESSURE: 74 MMHG

## 2024-06-18 DIAGNOSIS — J01.00 SUBACUTE MAXILLARY SINUSITIS: ICD-10-CM

## 2024-06-18 DIAGNOSIS — M54.50 ACUTE BILATERAL LOW BACK PAIN WITHOUT SCIATICA: Primary | ICD-10-CM

## 2024-06-18 LAB
BILIRUB SERPL-MCNC: NEGATIVE MG/DL
BLOOD URINE, POC: NEGATIVE
COLOR, POC UA: YELLOW
GLUCOSE UR QL STRIP: NEGATIVE
KETONES UR QL STRIP: NEGATIVE
LEUKOCYTE ESTERASE URINE, POC: NEGATIVE
NITRITE, POC UA: NEGATIVE
PH, POC UA: 7
PROTEIN, POC: NEGATIVE
SPECIFIC GRAVITY, POC UA: 1.01
UROBILINOGEN, POC UA: 0.2

## 2024-06-18 PROCEDURE — 99214 OFFICE O/P EST MOD 30 MIN: CPT | Mod: ,,, | Performed by: FAMILY MEDICINE

## 2024-06-18 PROCEDURE — 81003 URINALYSIS AUTO W/O SCOPE: CPT | Mod: QW,,, | Performed by: FAMILY MEDICINE

## 2024-06-18 RX ORDER — AZITHROMYCIN 250 MG/1
TABLET, FILM COATED ORAL
Qty: 1 TABLET | Refills: 0 | Status: SHIPPED | OUTPATIENT
Start: 2024-06-18

## 2024-06-18 RX ORDER — PREDNISONE 20 MG/1
20 TABLET ORAL DAILY
Qty: 5 TABLET | Refills: 0 | Status: SHIPPED | OUTPATIENT
Start: 2024-06-18 | End: 2024-06-23

## 2024-06-18 NOTE — PROGRESS NOTES
Subjective:       Patient ID: Sha Goldberg is a 28 y.o. male.    Chief Complaint: Referral (Ortho - Medically Discharged from  do to hurt back ) and Sinus Problem (X1 Week - Productive cough - No Fever - No Chills)    Pt is a 27 y/o male who presents for eval of Back pain. Pt reports he was at training ([employer]) . He  was having sever back pain he was treated for a fracture in his lower spine, with Ibuprofen which did not help. He was discharged medically for chronic back pain. He request referral to spine surgeon. He reports he was instructed to see a spine Dr.. Pt also report sinus drainage and cough for the past 2 weeks, no fever or chills          Current Outpatient Medications:     azithromycin (ZITHROMAX Z-UMU) 250 MG tablet, As directed, Disp: 1 tablet, Rfl: 0    predniSONE (DELTASONE) 20 MG tablet, Take 1 tablet (20 mg total) by mouth once daily. for 5 days, Disp: 5 tablet, Rfl: 0    Review of patient's allergies indicates:  No Known Allergies    Past Medical History:   Diagnosis Date    Acne     Hypertension        History reviewed. No pertinent surgical history.    History reviewed. No pertinent family history.    Social History     Tobacco Use    Smoking status: Never    Smokeless tobacco: Never   Substance Use Topics    Alcohol use: Never    Drug use: Never       Review of Systems   Constitutional:  Negative for diaphoresis, fatigue, fever and unexpected weight change.   HENT:  Negative for rhinorrhea, sinus pressure/congestion and sneezing.    Respiratory:  Negative for cough, chest tightness, shortness of breath and wheezing.    Cardiovascular:  Negative for chest pain, palpitations and leg swelling.   Gastrointestinal:  Negative for constipation, diarrhea, nausea and vomiting.   Genitourinary:  Negative for difficulty urinating.   Musculoskeletal:  Positive for arthralgias, back pain and myalgias.   Neurological:  Negative for dizziness, weakness, light-headedness and  "headaches.         Current Medications:   Medication List with Changes/Refills   New Medications    AZITHROMYCIN (ZITHROMAX Z-UMU) 250 MG TABLET    As directed       Start Date: 6/18/2024 End Date: --    PREDNISONE (DELTASONE) 20 MG TABLET    Take 1 tablet (20 mg total) by mouth once daily. for 5 days       Start Date: 6/18/2024 End Date: 6/23/2024            Objective:        Vitals:    06/18/24 1357   BP: 129/74   BP Location: Left arm   Patient Position: Sitting   BP Method: Medium (Automatic)   Pulse: 88   Resp: 16   Temp: 98.4 °F (36.9 °C)   TempSrc: Oral   SpO2: 97%   Weight: 94.8 kg (209 lb)   Height: 5' 7" (1.702 m)       Physical Exam  Constitutional:       Appearance: Normal appearance.   HENT:      Head: Normocephalic and atraumatic.      Right Ear: External ear normal.      Left Ear: External ear normal.      Mouth/Throat:      Mouth: Mucous membranes are moist.      Pharynx: Oropharynx is clear.   Eyes:      Extraocular Movements: Extraocular movements intact.      Pupils: Pupils are equal, round, and reactive to light.   Cardiovascular:      Rate and Rhythm: Normal rate and regular rhythm.      Heart sounds: Normal heart sounds.   Pulmonary:      Effort: Pulmonary effort is normal.      Breath sounds: Normal breath sounds.   Abdominal:      Palpations: Abdomen is soft.   Musculoskeletal:         General: No swelling, tenderness or deformity. Normal range of motion.      Cervical back: Neck supple.      Comments: Passsive SLR negative at 45 degrees, Active SLR negative.   Skin:     General: Skin is warm and dry.      Capillary Refill: Capillary refill takes less than 2 seconds.   Neurological:      General: No focal deficit present.      Mental Status: He is alert and oriented to person, place, and time.   Psychiatric:         Mood and Affect: Mood normal.         Behavior: Behavior normal.     Lumbar Spine XR reveals   There is irregularity of the endplates at T10-11, T11-T12, T12-L1, and L1-L2 and to " a lesser degree at L3-L4.  There are anterior osteophytes at T10-11, unchanged.  There is calcification anterior to the L3-4 disc space. The overall appearance is most suggestive of Scheuermann's disease.  No acute bony abnormalities are seen.            Assessment:       1. Acute bilateral low back pain without sciatica    2. Subacute maxillary sinusitis        Plan:       Plenty fluids  Refer to ortho spine   Problem List Items Addressed This Visit    None  Visit Diagnoses       Acute bilateral low back pain without sciatica    -  Primary    Relevant Orders    X-Ray Lumbar Spine AP And Lateral (Completed)    POCT URINALYSIS W/O SCOPE (Completed)    Ambulatory referral/consult to [facility]    Subacute maxillary sinusitis        Relevant Medications    azithromycin (ZITHROMAX Z-UMU) 250 MG tablet    predniSONE (DELTASONE) 20 MG tablet              Follow up if symptoms worsen or fail to improve.    Zahida Charles MD     Instructed patient that if symptoms fail to improve or worsen patient should seek immediate medical attention or report to the nearest emergency department. Patient expressed verbal agreement and understanding to this plan of care.

## 2024-06-25 DIAGNOSIS — M54.12 CERVICAL RADICULOPATHY: Primary | ICD-10-CM

## 2024-06-25 DIAGNOSIS — M54.16 LUMBAR RADICULOPATHY: ICD-10-CM

## 2024-06-26 ENCOUNTER — OFFICE VISIT (OUTPATIENT)
Dept: SPINE | Facility: CLINIC | Age: 29
End: 2024-06-26

## 2024-06-26 ENCOUNTER — HOSPITAL ENCOUNTER (OUTPATIENT)
Dept: RADIOLOGY | Facility: HOSPITAL | Age: 29
Discharge: HOME | End: 2024-06-26
Attending: NURSE PRACTITIONER

## 2024-06-26 VITALS — HEIGHT: 68 IN | WEIGHT: 206 LBS | BODY MASS INDEX: 31.22 KG/M2

## 2024-06-26 DIAGNOSIS — M54.16 LUMBAR RADICULOPATHY, CHRONIC: Primary | ICD-10-CM

## 2024-06-26 DIAGNOSIS — M54.16 LUMBAR RADICULOPATHY: ICD-10-CM

## 2024-06-26 DIAGNOSIS — M54.50 ACUTE BILATERAL LOW BACK PAIN WITHOUT SCIATICA: ICD-10-CM

## 2024-06-26 PROCEDURE — 72110 X-RAY EXAM L-2 SPINE 4/>VWS: CPT | Mod: 26,,, | Performed by: RADIOLOGY

## 2024-06-26 PROCEDURE — 99213 OFFICE O/P EST LOW 20 MIN: CPT | Mod: PBBFAC,25 | Performed by: NURSE PRACTITIONER

## 2024-06-26 PROCEDURE — 99204 OFFICE O/P NEW MOD 45 MIN: CPT | Mod: S$PBB,,, | Performed by: NURSE PRACTITIONER

## 2024-06-26 PROCEDURE — 72110 X-RAY EXAM L-2 SPINE 4/>VWS: CPT | Mod: TC

## 2024-06-26 PROCEDURE — 99999 PR PBB SHADOW E&M-EST. PATIENT-LVL III: CPT | Mod: PBBFAC,,, | Performed by: NURSE PRACTITIONER

## 2024-06-26 RX ORDER — IBUPROFEN 600 MG/1
600 TABLET ORAL 3 TIMES DAILY
COMMUNITY
Start: 2024-05-10

## 2024-06-26 RX ORDER — LIDOCAINE 50 MG/G
1 PATCH TOPICAL
COMMUNITY
Start: 2024-05-10

## 2024-06-26 RX ORDER — CYCLOBENZAPRINE HCL 5 MG
5 TABLET ORAL 2 TIMES DAILY
COMMUNITY
Start: 2024-05-10

## 2024-06-26 NOTE — PROGRESS NOTES
MDM/time:  45-50 minutes spent on this encounter including 15 minutes reviewing imaging and notes, 20 minutes with the patient, 10 minutes documentation    ASSESSMENT:  28 y.o. male with lumbar spine pain, has been diagnosed with a lumbar compression fracture unable to visualize this on x-ray    PLAN:  MRI lumbar spine  Follow-up after MRI    HPI:  28 y.o. male here for evaluation of lower back pain that has been ongoing for the past 2 months.  Patient reports he was in the  doing physical exercises and feels that he may have pulled a muscle in his back on May 7, 2024.  He was seen by medical diagnosed with possible pulled muscle treated with pain patches later saw a civilian doctor who thought he may have a fracture in his lumbar spine.  Unsure if fracture was old or new.  No known injury.  No difficulty with  strength.  No issues with balance or falls.  Denies bladder bowel incontinence.  No difficulty walking distances.  Currently takes ibuprofen as needed for pain.  No recent physical therapy.  No prior pain management.  He had an MRI in [place] where he was stationed but unfortunately does not have the disc for review.  No prior spine surgery.  Patient is not a smoker.    IMAGING:  X-Ray Lumbar 4-5 View including Bending Views  Narrative: EXAMINATION:  XR LUMBAR SPINE 4-5 VIEW WITH BENDING VIEWS    CLINICAL HISTORY:  Radiculopathy, lumbar region    COMPARISON:  None    TECHNIQUE:  Frontal and lateral views of the lumbosacral spine. Lateral views obtained in the neutral, flexion, and extension positions.    FINDINGS:  Accentuation of normal lumbar lordosis.  No significant anterolisthesis or retrolisthesis.  Mild scattered degenerative change of the lumbar spine.  Lumbar vertebral body heights appear maintained.  Transitional vertebral body at the lumbosacral junction.  Impression: As above.    Point of Service: [facility]    Electronically signed by: Joo  Ernie  Date:    06/26/2024  Time:    12:12       Past Medical History:   Diagnosis Date    Acne     Hypertension      History reviewed. No pertinent surgical history.  Social History     Tobacco Use    Smoking status: Never    Smokeless tobacco: Never   Substance Use Topics    Alcohol use: Never    Drug use: Never      Current Outpatient Medications   Medication Instructions    azithromycin (ZITHROMAX Z-UMU) 250 MG tablet As directed    cyclobenzaprine (FLEXERIL) 5 mg, 2 times daily    ibuprofen (ADVIL,MOTRIN) 600 mg, 3 times daily    LIDOcaine (LIDODERM) 5 % 1 patch        EXAM:  Constitutional  General Appearance:  Body mass index is 31.32 kg/m²., NAD  Psychiatric   Orientation: Oriented to time, oriented to place, oriented to person  Mood and Affect: Active and alert, normal mood, normal affect  Gait and Station   Appearance:  Antalgic gait, normal tandem gait, able to walk on toes, able to walk on heels    LUMBAR  Musculoskeletal System   Hips: Normal appearance, no leg length discrepancy, normal motion; left, normal motion; right    Lumbar Spine                   Inspection:  Kyphotic alignment in the thoracic spine, normal sagittal balance                  Range of motion:  Decreased flexion, extension, lateral bending, rotation. Pain with range of motion                  Bony Palpation of the Lumbar Spine:  No tenderness of the spinous process, no tenderness of the sacrum, no tenderness of the coccyx                  Bony Palpation of the Right Hip:  No tenderness of the iliac crest, no tenderness of the sciatic notch, no tenderness of the SI joint                  Bony Palpation of the Left Hip:  No tenderness of the iliac crest, no tenderness of the sciatic notch, no tenderness of the SI joint                  Soft Tissue Palpation on the Right:  + tenderness of the paraspinal region, no tenderness of the iliolumbar region                  Soft Tissue Palpation on the Left:  + tenderness of the paraspinal  region, no tenderness of the iliolumbar region    Motor Strength   L1 Right:  Hip flexion iliopsoas 5/5    L1 Left:  Hip flexion iliopsoas 5/5              L2-L4 Right:  Knee extension quadriceps 5/5, tibialis anterior 5/5              L2-L4 Left:  Knee extension quadriceps 5/5, tibialis anterior 5/5   L5 Right:  Extensor hallucis llongus 5/5,    L5 Left:  Extensor hallucis longus 5/5,    S1 Right:  Plantar flexion gastrocnemius 5/5   S1 Left:  Plantar flexion gastrocnemius 5/5    Neurological System   Ankle Reflex Right:  normal   Ankle Reflex Left: normal   Knee Reflex Right:  normal   Knee Reflex Left:  normal   Sensation on the Right:  L2 normal, L3 normal, L4 normal, L5 normal, S1 normal   Sensation on the Left:  L2 normal, L3 normal, L4 normal, L5 normal, S1 normal              Special Test on the Right:  Seated straight leg raising test negative, no clonus of the ankle              Special Test on the Left:  Seated straight leg raising test negative, no clonus of the ankle    Skin   Lumbosacral Spine:  Normal skin    Cardiovascular System   Arterial Pulses Right:  Posterior tibialis normal, dorsalis pedis normal   Arterial Pulses Left:  Posterior tibialis normal, dorsalis pedis normal   Edema Right: None   Edema Left:  None

## 2024-06-27 ENCOUNTER — OFFICE VISIT (OUTPATIENT)
Dept: SPINE | Facility: CLINIC | Age: 29
End: 2024-06-27

## 2024-06-27 DIAGNOSIS — M51.36 DDD (DEGENERATIVE DISC DISEASE), LUMBAR: Primary | ICD-10-CM

## 2024-06-27 DIAGNOSIS — M54.16 LUMBAR RADICULOPATHY, CHRONIC: ICD-10-CM

## 2024-06-27 PROCEDURE — 99999 PR PBB SHADOW E&M-EST. PATIENT-LVL II: CPT | Mod: PBBFAC,,, | Performed by: ORTHOPAEDIC SURGERY

## 2024-06-27 PROCEDURE — 99214 OFFICE O/P EST MOD 30 MIN: CPT | Mod: S$PBB,,, | Performed by: ORTHOPAEDIC SURGERY

## 2024-06-27 PROCEDURE — 99212 OFFICE O/P EST SF 10 MIN: CPT | Mod: PBBFAC | Performed by: ORTHOPAEDIC SURGERY

## 2024-06-27 NOTE — PROGRESS NOTES
MDM/time:  30-35 minutes spent on this encounter including 10 minutes reviewing imaging and notes, 15 minutes with the patient, 5 minutes documentation    ASSESSMENT:  28 y.o. male with low back pain    PLAN:  No fracture seen on x-ray or MRI.  Physical therapy.  Follow-up as needed.  Okay to try to get back into the  wants his back pain resolved    HPI:  28 y.o. male here for repeat evaluation of lower back pain.  Patient reports he was in the  doing physical exercises and feels that he may have pulled a muscle in his back on May 7, 2024.  He was seen by medical diagnosed with possible pulled muscle treated with pain patches later saw a civilian doctor who thought he may have a fracture in his lumbar spine.  Unsure if fracture was old or new.  Denies any prior injuries.  No difficulty with  strength.  No issues with balance or falls.  Denies bladder bowel incontinence.  No difficulty walking distances.  Currently takes ibuprofen as needed for pain.  No recent physical therapy.  No prior pain management.  No prior spine surgery.  Patient is not a smoker.    IMAGING:  X-rays lumbar spine reviewed show:   On the AP there is normal coronal alignment.  There are 5 non-rib-bearing lumbar vertebrae.  There is transitional anatomy with partial sacralization of L5 on the left.  On the lateral there is maintained lumbar lordosis.  There is mild spondylotic disease with decreased disc height and osteophyte formation noted.  No fractures or listhesis noted.  No instability on flexion-extension views.    MRI lumbar spine 06/27/2024 reviewed shows:  No fractures noted.  At L3-4 there is a left lateral disc protrusion with moderate left foraminal stenosis    Past Medical History:   Diagnosis Date    Acne     Hypertension      No past surgical history on file.  Social History     Tobacco Use    Smoking status: Never    Smokeless tobacco: Never   Substance Use Topics    Alcohol use: Never    Drug use: Never       Current Outpatient Medications   Medication Instructions    azithromycin (ZITHROMAX Z-UMU) 250 MG tablet As directed    cyclobenzaprine (FLEXERIL) 5 mg, 2 times daily    ibuprofen (ADVIL,MOTRIN) 600 mg, 3 times daily    LIDOcaine (LIDODERM) 5 % 1 patch        EXAM:  Constitutional  General Appearance:  There is no height or weight on file to calculate BMI., NAD  Psychiatric   Orientation: Oriented to time, oriented to place, oriented to person  Mood and Affect: Active and alert, normal mood, normal affect  Gait and Station   Appearance:  Antalgic gait, normal tandem gait, able to walk on toes, able to walk on heels    LUMBAR  Musculoskeletal System   Hips: Normal appearance, no leg length discrepancy, normal motion; left, normal motion; right    Lumbar Spine                   Inspection:  Kyphotic alignment in the thoracic spine, normal sagittal balance                  Range of motion:  Decreased flexion, extension, lateral bending, rotation. Pain with range of motion                  Bony Palpation of the Lumbar Spine:  No tenderness of the spinous process, no tenderness of the sacrum, no tenderness of the coccyx                  Bony Palpation of the Right Hip:  No tenderness of the iliac crest, no tenderness of the sciatic notch, no tenderness of the SI joint                  Bony Palpation of the Left Hip:  No tenderness of the iliac crest, no tenderness of the sciatic notch, no tenderness of the SI joint                  Soft Tissue Palpation on the Right:  + tenderness of the paraspinal region, no tenderness of the iliolumbar region                  Soft Tissue Palpation on the Left:  + tenderness of the paraspinal region, no tenderness of the iliolumbar region    Motor Strength   L1 Right:  Hip flexion iliopsoas 5/5    L1 Left:  Hip flexion iliopsoas 5/5              L2-L4 Right:  Knee extension quadriceps 5/5, tibialis anterior 5/5              L2-L4 Left:  Knee extension quadriceps 5/5, tibialis anterior  5/5   L5 Right:  Extensor hallucis llongus 5/5,    L5 Left:  Extensor hallucis longus 5/5,    S1 Right:  Plantar flexion gastrocnemius 5/5   S1 Left:  Plantar flexion gastrocnemius 5/5    Neurological System   Ankle Reflex Right:  normal   Ankle Reflex Left: normal   Knee Reflex Right:  normal   Knee Reflex Left:  normal   Sensation on the Right:  L2 normal, L3 normal, L4 normal, L5 normal, S1 normal   Sensation on the Left:  L2 normal, L3 normal, L4 normal, L5 normal, S1 normal              Special Test on the Right:  Seated straight leg raising test negative, no clonus of the ankle              Special Test on the Left:  Seated straight leg raising test negative, no clonus of the ankle    Skin   Lumbosacral Spine:  Normal skin    Cardiovascular System   Arterial Pulses Right:  Posterior tibialis normal, dorsalis pedis normal   Arterial Pulses Left:  Posterior tibialis normal, dorsalis pedis normal   Edema Right: None   Edema Left:  None

## 2024-07-10 ENCOUNTER — CLINICAL SUPPORT (OUTPATIENT)
Dept: REHABILITATION | Facility: HOSPITAL | Age: 29
End: 2024-07-10

## 2024-07-10 DIAGNOSIS — R52 PAIN: ICD-10-CM

## 2024-07-10 DIAGNOSIS — R53.1 DECREASED STRENGTH: ICD-10-CM

## 2024-07-10 DIAGNOSIS — M54.16 LUMBAR RADICULOPATHY, CHRONIC: Primary | ICD-10-CM

## 2024-07-10 PROCEDURE — 97161 PT EVAL LOW COMPLEX 20 MIN: CPT

## 2024-07-10 NOTE — PLAN OF CARE
[facility]  Physical Therapy Initial Evaluation    Date: 7/10/2024   Name: Sha Goldberg  Clinic Number: [id number]    Therapy Diagnosis:   Encounter Diagnoses   Name Primary?    Lumbar radiculopathy, chronic Yes    Pain     Decreased strength      Physician: Dl Hughes MD    Physician Orders: PT Eval and Treat   Medical Diagnosis from Referral: LBP  Evaluation Date: 7/10/2024  Authorization Period Expiration: 9/17/24  Plan of Care Expiration: 10/2/24  Visit # / Visits authorized: 1/ until 9/17 [facility] assistance    Time In: 848  Time Out: 918  Total Appointment Time (timed & untimed codes): 30 minutes    Precautions: Standard    Subjective   Date of onset: 5/7/24    History of current condition - Sha reports: Did  exercises and hurt the back. Was given medication and had imaging. The pain worsened in time. Did not have low back pain prior to this incident. Intermittent throbbing/dull ache and occasionally sharp.   Relief with medication, pain patches and the bed at home. Sleeping through the night. Denies numbness/tingling and no falls with no buckling. Right handed. Steps to door with no trouble. Prolonged sitting can make pain worse.   Currently not working and no hobbies       Medical History:   Past Medical History:   Diagnosis Date    Acne     Hypertension        Surgical History:   Sha Goldberg  has no past surgical history on file.    Medications:   Sha has a current medication list which includes the following prescription(s): azithromycin, cyclobenzaprine, ibuprofen, and lidocaine.    Allergies:   Review of patient's allergies indicates:  No Known Allergies     Imaging, MRI studies, bone scan films: MRI LUMBAR SPINE WITHOUT CONTRAST  CLINICAL HISTORY:  Lumbar radiculopathy, symptoms persist with conservative treatment; Radiculopathy, lumbar region  TECHNIQUE:  Axial and sagittal imaging of the spine is performed using T1, T2, STIR and T2 fat sat  sequences without contrast.  COMPARISON:  None available  FINDINGS:  Vertebral bodies are normal in height and alignment.  Disc space heights are well-maintained.  L3-4: There is mild diffuse disc bulge with mild central canal and mild bilateral foraminal stenosis.  Remaining levels appear within normal limits.  Spinal cord signal appears within normal limits.  Osseous marrow signal appears within normal limits.  Impression:  Degenerative disc disease as described above.        XR LUMBAR SPINE 4-5 VIEW WITH BENDING VIEWS  CLINICAL HISTORY:  Radiculopathy, lumbar region  COMPARISON:  None  TECHNIQUE:  Frontal and lateral views of the lumbosacral spine. Lateral views obtained in the neutral, flexion, and extension positions.  FINDINGS:  Accentuation of normal lumbar lordosis.  No significant anterolisthesis or retrolisthesis.  Mild scattered degenerative change of the lumbar spine.  Lumbar vertebral body heights appear maintained.  Transitional vertebral body at the lumbosacral junction.  Impression:  As above.       Pain:  Current 4/10, worst 10/10,  Location: bilateral back    Description: Aching, Dull, and Throbbing  Aggravating Factors: Sitting and Lifting  Easing Factors: pain medication and rest        Objective     Able to single leg balance    Posture:  Level pelvis  No leg length difference    Manual muscle test:  Bilateral hip flexion 5/5  Right hamstring 5/5, left hamstring 4/5  Right quad 5/5, left quad 4-/5  Right glute med 4/5, left glute med 4+/5  Bilateral glute max 4+/5  Weak multifidus firing bilaterally    Palpation:  (+) bilateral PSIS and lumbar paraspinals    Sensation:  Bilateral light touch intact for lower extremities dermatomes    Special Tests:   (-) bilateral sitting slump test  (-) bilateral straight leg raise test  (-) bilateral ABNER  No quad lag      Limitation/Restriction for FOTO Survey    Therapist reviewed FOTO scores for Sharebecca Goldberg on 7/10/2024.   FOTO documents entered  into [facility] - see Media section.    Intake Score: 49%           Home Exercises and Patient Education Provided    Education provided:   Eval Findings  Patient educated on biomechanical justification for therapeutic exercise and importance of compliance with HEP in order to improve overall impairments and QOL   Patient was educated on all the above exercise prior/during/after for proper posture, positioning, and execution for safe performance with home exercise program if exercises were given during evaluation.  Patient educated on the importance of improved core and upper and lower extremity strength in order to improve alignment of the spine and upper and lower extremities with static positions and dynamic movement.   Patient educated on the importance of strong core and lower extremity musculature in order to improve both static and dynamic balance, improve gait mechanics, reduce fall risk and improve household and community mobility.     Written Home Exercises Provided:  - . Evaluation findings discussed.  Exercises were reviewed and Sha was able to demonstrate them prior to the end of the session.  Sha demonstrated good  understanding of the education provided.       Assessment   Sha is a 28 y.o. male referred to outpatient Physical Therapy with a medical diagnosis of low back pain. Patient presents with good lower extremities strength with mild left leg weakness and weak core stability. Patient does have generalized decreased flexibility and will benefit from stretching. Patient was negative for discogenic symptoms but imaging does show disc bulge and arthritis. Patient will benefit from skilled physical therapy at this time to address deficits.     Patient prognosis is Good.     Patient will benefit from skilled outpatient Physical Therapy to address the deficits stated above and in the chart below, provide patient /family education, and to maximize patientt's level of independence.     Plan of care  discussed with patient: Yes  Patient's spiritual, cultural and educational needs considered and patient is agreeable to the plan of care and goals as stated below:     Anticipated Barriers for therapy: none      Medical Necessity is demonstrated by the following  History  Co-morbidities and personal factors that may impact the plan of care [x] LOW: no personal factors / co-morbidities  [] MODERATE: 1-2 personal factors / co-morbidities  [] HIGH: 3+ personal factors / co-morbidities    Moderate / High Support Documentation:   Co-morbidities affecting plan of care: -    Personal Factors:   no deficits     Examination  Body Structures and Functions, activity limitations and participation restrictions that may impact the plan of care [x] LOW: addressing 1-2 elements  [] MODERATE: 3+ elements  [] HIGH: 4+ elements (please support below)    Moderate / High Support Documentation: -     Clinical Presentation [x] LOW: stable  [] MODERATE: Evolving  [] HIGH: Unstable     Decision Making/ Complexity Score: low         Goals:  Short Term Goals: 6 weeks   Patient will improve manual muscle test to 5/5 for posterior chain stability  Patient will maintain prolonged positions x 15 minutes with 2 /10 pain    Long Term Goals: 12 weeks   Patient will improve manual muscle test to have no juddering with active forward bend in core musculature  Patient will maintain prolonged positions x 30 minutes with  0 /10 pain  Patient will be independent with home exercise program by D/C    Plan   Plan of care Certification: 7/10/2024 to 10/2/24.    Outpatient Physical Therapy 2 times weekly for 12 weeks to include the following interventions: Aquatic Therapy, Hybresis with Dex, Cervical/Lumbar Traction, Electrical Stimulation IFC/Premod, Gait Training, Manual Therapy, Moist Heat/ Ice, Neuromuscular Re-ed, Patient Education, Self Care, Therapeutic Activities, Therapeutic Exercise, Ultrasound, and Dry Needling.     BERTA WHITFIELD, PT        I  CERTIFY THE NEED FOR THESE SERVICES FURNISHED UNDER THIS PLAN OF TREATMENT AND WHILE UNDER MY CARE.    Physician's comments:      Physician's Signature: ____________________________________________Date________________        Please fax this MD signed form to:  [facility]  [phone] fax

## 2024-07-16 ENCOUNTER — CLINICAL SUPPORT (OUTPATIENT)
Dept: REHABILITATION | Facility: HOSPITAL | Age: 29
End: 2024-07-16

## 2024-07-16 DIAGNOSIS — M54.16 LUMBAR RADICULOPATHY, CHRONIC: Primary | ICD-10-CM

## 2024-07-16 DIAGNOSIS — R52 PAIN: ICD-10-CM

## 2024-07-16 DIAGNOSIS — R53.1 DECREASED STRENGTH: ICD-10-CM

## 2024-07-16 PROCEDURE — 97014 ELECTRIC STIMULATION THERAPY: CPT | Mod: CQ

## 2024-07-16 PROCEDURE — 97110 THERAPEUTIC EXERCISES: CPT | Mod: CQ

## 2024-07-16 NOTE — PROGRESS NOTES
[facility]   Physical Therapy Treatment Note      Name: Sha Goldberg  Clinic Number: [id number]    Therapy Diagnosis: Lumbar radiculopathy, chronic   Encounter Diagnoses   Name Primary?    Pain     Decreased strength     Lumbar radiculopathy, chronic Yes     Physician: Dl Hughes MD    Visit Date: 7/16/2024       Physician Orders: PT Eval and Treat   Medical Diagnosis from Referral: LBP  Evaluation Date: 7/10/2024  Authorization Period Expiration: 9/17/24  Plan of Care Expiration: 10/2/24  Visit # / Visits authorized: 1/ until 9/17 [facility] assistance 2/17    PTA Visit #: 1/5     Time In: 1048  Time Out: 1130  Total Billable Time: 42 minutes    Subjective     Pt reports: My back has been throbbing all the way across since last night.  He  will be  compliant with home exercise program.  Response to previous treatment: eval  Functional change: still high pain level with low back     Pain: 7/10  Location: bilateral back      Objective          Treatment     Sha received the treatments listed below:      therapeutic exercises to develop strength, endurance, and ROM for 32 minutes including: increased time to complete  Nustep x 5 min  Slant board stretch 3 x 30 s/h  Standing Hamstring stretch 3 x 30 s/h  Lateral trunk rotation 10 x 10 s/h   Bridges with brace 2 x 10   Single knee to chest using towel 5 x 10 s/h   March with brace x 10   Hip abduction red tband 2 x 10  Standing back extension stretch 5 x 10 s/h -- next visit    NOT TODAY manual therapy techniques: Joint mobilizations, Manual traction, Myofacial release, and Soft tissue Mobilization were applied to the:  for  minutes, including:      NOT TODAY neuromuscular re-education activities to improve: Balance, Coordination, Kinesthetic, Proprioception, and Posture for  minutes. The following activities were included:      NOT TODAY therapeutic activities to improve functional performance for   minutes,  including:      supervised modalities after being cleared for contradictions: IFC Electrical Stimulation:  Sha received IFC Electrical Stimulation for pain control applied to the lumbar paraspinal areas in prone position per request. Pt received stimulation at 100% scan at a frequency of 80/150 Hz, 12.5-13.5 Volts CV for 10 minutes. Sha tolderated treatment well without any adverse effects during or after application.     Patient Education and Home Exercises       Education provided:   - received verbal and tactile cues to facilitate proper execution of exercises and body mechanics.    Written Home Exercises Provided:  next visit . Exercises were reviewed and Sha was able to demonstrate them prior to the end of the session.  Sha demonstrated  understanding of the education provided. See EMR under Patient Instructions for exercises provided during therapy sessions    Assessment     Patient tolerated his first PT session fair with c/o R sided low back/leg with all motions today so may need to focus more back extension exercises/stretches or modify current to help decrease increase pain. He tolerated e-stim for pain without adverse side effects observed.     Sha Is progressing well towards his goals.   Pt prognosis is Good.     Pt will continue to benefit from skilled outpatient physical therapy to address the deficits listed in the problem list box on initial evaluation, provide pt/family education and to maximize pt's level of independence in the home and community environment.     Pt's spiritual, cultural and educational needs considered and pt agreeable to plan of care and goals.     Anticipated barriers to physical therapy: none      Goals:  Short Term Goals: 6 weeks   Patient will improve manual muscle test to 5/5 for posterior chain stability  Patient will maintain prolonged positions x 15 minutes with 2 /10 pain     Long Term Goals: 12 weeks   Patient will improve manual muscle test to have no  juddering with active forward bend in core musculature  Patient will maintain prolonged positions x 30 minutes with  0 /10 pain  Patient will be independent with home exercise program by D/C    Plan     Continue with current Plan of Care.    Plan of care Certification: 7/10/2024 to 10/2/24.     Outpatient Physical Therapy 2 times weekly for 12 weeks to include the following interventions: Aquatic Therapy, Hybresis with Dex, Cervical/Lumbar Traction, Electrical Stimulation IFC/Premod, Gait Training, Manual Therapy, Moist Heat/ Ice, Neuromuscular Re-ed, Patient Education, Self Care, Therapeutic Activities, Therapeutic Exercise, Ultrasound, and Dry Needling.  Monae Solomon, PTA

## 2024-07-18 ENCOUNTER — CLINICAL SUPPORT (OUTPATIENT)
Dept: REHABILITATION | Facility: HOSPITAL | Age: 29
End: 2024-07-18

## 2024-07-18 DIAGNOSIS — R53.1 DECREASED STRENGTH: ICD-10-CM

## 2024-07-18 DIAGNOSIS — R52 PAIN: Primary | ICD-10-CM

## 2024-07-18 PROCEDURE — 97112 NEUROMUSCULAR REEDUCATION: CPT | Mod: CQ

## 2024-07-18 PROCEDURE — 97110 THERAPEUTIC EXERCISES: CPT | Mod: CQ

## 2024-07-18 NOTE — PROGRESS NOTES
[facility]   Physical Therapy Treatment Note      Name: Sha Goldberg  Clinic Number: [id number]    Therapy Diagnosis: Lumbar radiculopathy, chronic   Encounter Diagnoses   Name Primary?    Pain Yes    Decreased strength      Physician: Dl Hughes MD    Visit Date: 7/18/2024       Physician Orders: PT Eval and Treat   Medical Diagnosis from Referral: LBP  Evaluation Date: 7/10/2024  Authorization Period Expiration: 9/17/24  Plan of Care Expiration: 10/2/24  Visit # / Visits authorized: 3/ until 9/17 rush assistance 2/17    PTA Visit #: 2/5     Time In: 1:45 pm  Time Out: 2:30 pm   Total Billable Time: 38 minutes    Subjective     Pt reports: He states that he has pain from the middle of his back to the right side since Tuesday.   He  will be  compliant with home exercise program.  Response to previous treatment: eval  Functional change: still high pain level with low back     Pain: 8/10  Location: bilateral back      Objective          Treatment     Sha received the treatments listed below:      therapeutic exercises to develop strength, endurance, and ROM for 15 minutes including: increased time to complete  Nustep x 5 min  Slant board stretch 3 x 30 s/h  Standing Hamstring stretch 3 x 30 s/h  Ball roll outs 5 x 5 second hold     manual therapy techniques: Joint mobilizations, Manual traction, Myofacial release, and Soft tissue Mobilization were applied to the:  for  minutes, including:      neuromuscular re-education activities to improve: Balance, Coordination, Kinesthetic, Proprioception, and Posture for 23 minutes. The following activities were included:  Cybex back extension 3 x 10 2#  Bridges on swiss ball 2 x 10   Lower trunk rotation 2 x 10   Hamstring rolls 2 x 10   Clamshells ------        supervised modalities after being cleared for contradictions: IFC Electrical Stimulation:  Sha received IFC Electrical Stimulation for pain control applied to the lumbar  paraspinal areas in prone position per request. Pt received stimulation at 100% scan at a frequency of 80/150 Hz, 12.5-13.5 Volts CV for 0 minutes. Sha tolderated treatment well without any adverse effects during or after application.     Patient Education and Home Exercises       Education provided:   - received verbal and tactile cues to facilitate proper execution of exercises and body mechanics.    Written Home Exercises Provided:  yes . Exercises were reviewed and Sha was able to demonstrate them prior to the end of the session.  Sha demonstrated  understanding of the education provided. See EMR under Patient Instructions for exercises provided during therapy sessions    Assessment     Patient continue to have c/o pain on R side low back/leg with all motions today. Therapist added Cybex back extension machines today. He did well with all exercises but had complaints of pain throughout treatment session. Home exercise program was given today. He verbalized and demonstrated understanding.     Sha Is progressing well towards his goals.   Pt prognosis is Good.     Pt will continue to benefit from skilled outpatient physical therapy to address the deficits listed in the problem list box on initial evaluation, provide pt/family education and to maximize pt's level of independence in the home and community environment.     Pt's spiritual, cultural and educational needs considered and pt agreeable to plan of care and goals.     Anticipated barriers to physical therapy: none      Goals:  Short Term Goals: 6 weeks   Patient will improve manual muscle test to 5/5 for posterior chain stability  Patient will maintain prolonged positions x 15 minutes with 2 /10 pain     Long Term Goals: 12 weeks   Patient will improve manual muscle test to have no juddering with active forward bend in core musculature  Patient will maintain prolonged positions x 30 minutes with  0 /10 pain  Patient will be independent with home  exercise program by D/C    Plan     Continue with current Plan of Care.    Plan of care Certification: 7/10/2024 to 10/2/24.     Outpatient Physical Therapy 2 times weekly for 12 weeks to include the following interventions: Aquatic Therapy, Hybresis with Dex, Cervical/Lumbar Traction, Electrical Stimulation IFC/Premod, Gait Training, Manual Therapy, Moist Heat/ Ice, Neuromuscular Re-ed, Patient Education, Self Care, Therapeutic Activities, Therapeutic Exercise, Ultrasound, and Dry Needling.  Hellen Lozano, PTA

## 2024-07-23 ENCOUNTER — CLINICAL SUPPORT (OUTPATIENT)
Dept: REHABILITATION | Facility: HOSPITAL | Age: 29
End: 2024-07-23

## 2024-07-23 DIAGNOSIS — R52 PAIN: Primary | ICD-10-CM

## 2024-07-23 DIAGNOSIS — R53.1 DECREASED STRENGTH: ICD-10-CM

## 2024-07-23 NOTE — PROGRESS NOTES
[facility]   Physical Therapy Treatment Note      Name: Sha Goldberg  Clinic Number: [id number]    Therapy Diagnosis: Lumbar radiculopathy, chronic   Encounter Diagnoses   Name Primary?    Pain Yes    Decreased strength      Physician: Dl Hughes MD    Visit Date: 7/23/2024       Physician Orders: PT Eval and Treat   Medical Diagnosis from Referral: LBP  Evaluation Date: 7/10/2024  Authorization Period Expiration: 9/17/24  Plan of Care Expiration: 10/2/24  Visit # / Visits authorized: 4/ until 9/17 rush assistance 2/17    PTA Visit #: 4/5     Time In: 1:00 pm  Time Out: 1:38 pm   Total Billable Time: 38 minutes    Subjective     Pt reports: He said his pain is not to bad today.  He  will be  compliant with home exercise program.  Response to previous treatment: eval  Functional change: still high pain level with low back     Pain: 5/10  Location: bilateral back      Objective          Treatment     Sha received the treatments listed below:      therapeutic exercises to develop strength, endurance, and ROM for 15 minutes including: increased time to complete  Nustep x 5 min  Slant board stretch 3 x 30 s/h  Standing Hamstring stretch 3 x 30 s/h  Ball roll outs 5 x 5 second hold     manual therapy techniques: Joint mobilizations, Manual traction, Myofacial release, and Soft tissue Mobilization were applied to the:  for  minutes, including:      neuromuscular re-education activities to improve: Balance, Coordination, Kinesthetic, Proprioception, and Posture for 23 minutes. The following activities were included:  Cybex back extension 3 x 10 3#  Cybex leg press 3 x 10 6#  Cybex hip abduction 2 x 10 2#  Cybex hip extension 2 x 10 3#        (Not today   Bridges on swiss ball 2 x 10   Lower trunk rotation 2 x 10   Hamstring rolls 2 x 10   Clamshells ------        supervised modalities after being cleared for contradictions: IFC Electrical Stimulation:  Sha received IFC  Electrical Stimulation for pain control applied to the lumbar paraspinal areas in prone position per request. Pt received stimulation at 100% scan at a frequency of 80/150 Hz, 12.5-13.5 Volts CV for 0 minutes. Sha tolderated treatment well without any adverse effects during or after application.     Patient Education and Home Exercises       Education provided:   - received verbal and tactile cues to facilitate proper execution of exercises and body mechanics.    Written Home Exercises Provided:  yes . Exercises were reviewed and Sha was able to demonstrate them prior to the end of the session.  Sha demonstrated  understanding of the education provided. See EMR under Patient Instructions for exercises provided during therapy sessions    Assessment     Patient continue to have c/o pain on which ever leg he is standing  Therapist added Cybex hip machines today. He did well with all exercises but had complaints of pain throughout treatment session.   Sha Is progressing well towards his goals.   Pt prognosis is Good.     Pt will continue to benefit from skilled outpatient physical therapy to address the deficits listed in the problem list box on initial evaluation, provide pt/family education and to maximize pt's level of independence in the home and community environment.     Pt's spiritual, cultural and educational needs considered and pt agreeable to plan of care and goals.     Anticipated barriers to physical therapy: none      Goals:  Short Term Goals: 6 weeks   Patient will improve manual muscle test to 5/5 for posterior chain stability  Patient will maintain prolonged positions x 15 minutes with 2 /10 pain     Long Term Goals: 12 weeks   Patient will improve manual muscle test to have no juddering with active forward bend in core musculature  Patient will maintain prolonged positions x 30 minutes with  0 /10 pain  Patient will be independent with home exercise program by D/C    Plan     Continue with  current Plan of Care.    Plan of care Certification: 7/10/2024 to 10/2/24.     Outpatient Physical Therapy 2 times weekly for 12 weeks to include the following interventions: Aquatic Therapy, Hybresis with Dex, Cervical/Lumbar Traction, Electrical Stimulation IFC/Premod, Gait Training, Manual Therapy, Moist Heat/ Ice, Neuromuscular Re-ed, Patient Education, Self Care, Therapeutic Activities, Therapeutic Exercise, Ultrasound, and Dry Needling.  Hellen Lozano, PTA

## 2024-07-25 ENCOUNTER — CLINICAL SUPPORT (OUTPATIENT)
Dept: REHABILITATION | Facility: HOSPITAL | Age: 29
End: 2024-07-25

## 2024-07-25 DIAGNOSIS — R52 PAIN: Primary | ICD-10-CM

## 2024-07-25 DIAGNOSIS — R53.1 DECREASED STRENGTH: ICD-10-CM

## 2024-07-25 PROCEDURE — 97110 THERAPEUTIC EXERCISES: CPT | Mod: CQ

## 2024-07-25 PROCEDURE — 97112 NEUROMUSCULAR REEDUCATION: CPT | Mod: CQ

## 2024-07-25 NOTE — PROGRESS NOTES
[facility]   Physical Therapy Treatment Note      Name: Sha Goldberg  Clinic Number: [id number]    Therapy Diagnosis: Lumbar radiculopathy, chronic   Encounter Diagnoses   Name Primary?    Pain Yes    Decreased strength      Physician: Dl Hughes MD    Visit Date: 7/25/2024       Physician Orders: PT Eval and Treat   Medical Diagnosis from Referral: LBP  Evaluation Date: 7/10/2024  Authorization Period Expiration: 9/17/24  Plan of Care Expiration: 10/2/24  Visit # / Visits authorized: 5/ until 9/17 rush assistance 2/17    PTA Visit #: 4/5     Time In: 9:00 am  Time Out: 9:38 am  Total Billable Time: 38 minutes    Subjective     Pt reports: Patient states that he was in excruciating pain after last session.   He  will be  compliant with home exercise program.  Response to previous treatment: eval  Functional change: still high pain level with low back     Pain: 5/10  Location: bilateral back      Objective          Treatment     Sha received the treatments listed below:      therapeutic exercises to develop strength, endurance, and ROM for 15 minutes including:   Nustep x 5 min  Slant board stretch 3 x 30 s/h  Standing Hamstring stretch 3 x 30 s/h  Ball roll outs 5 x 5 second hold     manual therapy techniques: Joint mobilizations, Manual traction, Myofacial release, and Soft tissue Mobilization were applied to the:  for  minutes, including:      neuromuscular re-education activities to improve: Balance, Coordination, Kinesthetic, Proprioception, and Posture for 23 minutes. The following activities were included:  Cybex back extension 3 x 10 3#  Cybex leg press 3 x 10 6#  Bridges on swiss ball 2 x 10   Lower trunk rotation 2 x 10         (Not today   Hamstring rolls 2 x 10   Clamshells ------        supervised modalities after being cleared for contradictions: IFC Electrical Stimulation:  Sha received IFC Electrical Stimulation for pain control applied to the lumbar  paraspinal areas in prone position per request. Pt received stimulation at 100% scan at a frequency of 80/150 Hz, 12.5-13.5 Volts CV for 0 minutes. Sha tolderated treatment well without any adverse effects during or after application.     Patient Education and Home Exercises       Education provided:   - received verbal and tactile cues to facilitate proper execution of exercises and body mechanics.    Written Home Exercises Provided:  yes . Exercises were reviewed and Sha was able to demonstrate them prior to the end of the session.  Sha demonstrated  understanding of the education provided. See EMR under Patient Instructions for exercises provided during therapy sessions    Assessment     Patient continue to have c/o pain on which ever leg he is standing  Therapist took away cybex hip machine due to him stating he was in a lot of pain after visit. He did well with all exercises but had complaints of pain throughout treatment session.     Sha Is progressing well towards his goals.   Pt prognosis is Good.     Pt will continue to benefit from skilled outpatient physical therapy to address the deficits listed in the problem list box on initial evaluation, provide pt/family education and to maximize pt's level of independence in the home and community environment.     Pt's spiritual, cultural and educational needs considered and pt agreeable to plan of care and goals.     Anticipated barriers to physical therapy: none      Goals:  Short Term Goals: 6 weeks   Patient will improve manual muscle test to 5/5 for posterior chain stability  Patient will maintain prolonged positions x 15 minutes with 2 /10 pain     Long Term Goals: 12 weeks   Patient will improve manual muscle test to have no juddering with active forward bend in core musculature  Patient will maintain prolonged positions x 30 minutes with  0 /10 pain  Patient will be independent with home exercise program by D/C    Plan     Continue with current  Plan of Care.    Plan of care Certification: 7/10/2024 to 10/2/24.     Outpatient Physical Therapy 2 times weekly for 12 weeks to include the following interventions: Aquatic Therapy, Hybresis with Dex, Cervical/Lumbar Traction, Electrical Stimulation IFC/Premod, Gait Training, Manual Therapy, Moist Heat/ Ice, Neuromuscular Re-ed, Patient Education, Self Care, Therapeutic Activities, Therapeutic Exercise, Ultrasound, and Dry Needling.  Hellen Lozano, PTA

## 2024-07-30 ENCOUNTER — CLINICAL SUPPORT (OUTPATIENT)
Dept: REHABILITATION | Facility: HOSPITAL | Age: 29
End: 2024-07-30

## 2024-07-30 DIAGNOSIS — R53.1 DECREASED STRENGTH: ICD-10-CM

## 2024-07-30 DIAGNOSIS — R52 PAIN: Primary | ICD-10-CM

## 2024-07-30 PROCEDURE — 97112 NEUROMUSCULAR REEDUCATION: CPT

## 2024-07-30 PROCEDURE — 97110 THERAPEUTIC EXERCISES: CPT

## 2024-07-30 NOTE — PROGRESS NOTES
[facility]   Physical Therapy Treatment Note      Name: Sha Goldberg  Clinic Number: [id number]    Therapy Diagnosis: Lumbar radiculopathy, chronic   Encounter Diagnoses   Name Primary?    Pain Yes    Decreased strength      Physician: Dl Hughes MD    Visit Date: 7/30/2024       Physician Orders: PT Eval and Treat   Medical Diagnosis from Referral: LBP  Evaluation Date: 7/10/2024  Authorization Period Expiration: 9/17/24  Plan of Care Expiration: 10/2/24  Visit # / Visits authorized: 6/ until 9/17 rush assistance 2/17    PTA Visit #: 4/5     Time In: 813 am  Time Out: 910 am  Total Billable Time:  57 minutes    Subjective     Pt reports: Patient states he hasn't had the extreme pain since last Tuesday. Sometimes when bending over to feed the dogs I will feel pain later in the day. Picking up heavy objects makes the pain worse.     He  will be  compliant with home exercise program.  Response to previous treatment: eval  Functional change: still high pain level with low back     Pain: 5/10  Location: bilateral back      Objective          Treatment     Sha received the treatments listed below:      therapeutic exercises to develop strength, endurance, and ROM for 27 minutes including:   Nustep x 5 min  Slant board stretch 3 x 30 s/h  Standing Hamstring stretch 3 x 30 s/h  Ball roll outs 5 x 5 second hold   Sitting piriformis stretch     manual therapy techniques: Joint mobilizations, Manual traction, Myofacial release, and Soft tissue Mobilization were applied to the:  for  minutes, including:      neuromuscular re-education activities to improve: Balance, Coordination, Kinesthetic, Proprioception, and Posture for 30 minutes. The following activities were included:  Cybex back extension 3 x 10 4#  Cybex hip abduction 2pl x 20 bilateral  Cybex hip flexion 2pl x 20 bilateral   Cybex hip extension 2pl x 20 bilateral  Cybex abs 2pl x 30 (add a plate next session)          (Not  today)  Cybex leg press 3 x 10 6#  Bridges on swiss ball 2 x 10   Lower trunk rotation 2 x 10   (Not today)  Hamstring rolls 2 x 10   Clamshells ------      (Not today)  supervised modalities after being cleared for contradictions: IFC Electrical Stimulation:  Sha received IFC Electrical Stimulation for pain control applied to the lumbar paraspinal areas in prone position per request. Pt received stimulation at 100% scan at a frequency of 80/150 Hz, 12.5-13.5 Volts CV for 0 minutes. Sha tolderated treatment well without any adverse effects during or after application.     Patient Education and Home Exercises       Education provided:   - received verbal and tactile cues to facilitate proper execution of exercises and body mechanics.    Written Home Exercises Provided:  yes . Exercises were reviewed and Sha was able to demonstrate them prior to the end of the session.  Sha demonstrated  understanding of the education provided. See EMR under Patient Instructions for exercises provided during therapy sessions    Assessment     Patient continue to have c/o pain on which ever leg he is standing. Completed hip machine into flexion/extension/abduction and added cybex abs. Completed increased repetitions. He did well with all exercises and will be progressed as able.     Sha Is progressing well towards his goals.   Pt prognosis is Good.     Pt will continue to benefit from skilled outpatient physical therapy to address the deficits listed in the problem list box on initial evaluation, provide pt/family education and to maximize pt's level of independence in the home and community environment.     Pt's spiritual, cultural and educational needs considered and pt agreeable to plan of care and goals.     Anticipated barriers to physical therapy: none      Goals:  Short Term Goals: 6 weeks   Patient will improve manual muscle test to 5/5 for posterior chain stability  Patient will maintain prolonged positions x 15  minutes with 2 /10 pain     Long Term Goals: 12 weeks   Patient will improve manual muscle test to have no juddering with active forward bend in core musculature  Patient will maintain prolonged positions x 30 minutes with  0 /10 pain  Patient will be independent with home exercise program by D/C    Plan     Continue with current Plan of Care.    Plan of care Certification: 7/10/2024 to 10/2/24.     Outpatient Physical Therapy 2 times weekly for 12 weeks to include the following interventions: Aquatic Therapy, Hybresis with Dex, Cervical/Lumbar Traction, Electrical Stimulation IFC/Premod, Gait Training, Manual Therapy, Moist Heat/ Ice, Neuromuscular Re-ed, Patient Education, Self Care, Therapeutic Activities, Therapeutic Exercise, Ultrasound, and Dry Needling.  BERTA WHITFIELD, PT

## 2024-08-01 ENCOUNTER — CLINICAL SUPPORT (OUTPATIENT)
Dept: REHABILITATION | Facility: HOSPITAL | Age: 29
End: 2024-08-01

## 2024-08-01 DIAGNOSIS — R53.1 DECREASED STRENGTH: ICD-10-CM

## 2024-08-01 DIAGNOSIS — R52 PAIN: Primary | ICD-10-CM

## 2024-08-01 PROCEDURE — 97112 NEUROMUSCULAR REEDUCATION: CPT | Mod: CQ

## 2024-08-01 PROCEDURE — 97110 THERAPEUTIC EXERCISES: CPT | Mod: CQ

## 2024-08-01 NOTE — PROGRESS NOTES
[facility]   Physical Therapy Treatment Note      Name: Sha Goldberg  Clinic Number: [id number]    Therapy Diagnosis: Lumbar radiculopathy, chronic   Encounter Diagnoses   Name Primary?    Pain Yes    Decreased strength      Physician: Dl Hughes MD    Visit Date: 8/1/2024       Physician Orders: PT Eval and Treat   Medical Diagnosis from Referral: LBP  Evaluation Date: 7/10/2024  Authorization Period Expiration: 9/17/24  Plan of Care Expiration: 10/2/24  Visit # / Visits authorized: 7/ until 9/17 rush assistance 2/17    PTA Visit #: 1/5     Time In: 8:08 am  Time Out: 9: 58 am  Total Billable Time:  50 minutes    Subjective     Pt reports: Last night I had pain but I don't have any right now.    He  will be  compliant with home exercise program.  Response to previous treatment: eval  Functional change: still high pain level with low back     Pain: 0/10  Location: bilateral back      Objective          Treatment     Sha received the treatments listed below:      therapeutic exercises to develop strength, endurance, and ROM for 20 minutes including:   Nustep x 5 min  Slant board stretch 3 x 30 s/h  Standing Hamstring stretch 3 x 30 s/h  Ball roll outs 5 x 5 second hold   Sitting piriformis stretch     manual therapy techniques: Joint mobilizations, Manual traction, Myofacial release, and Soft tissue Mobilization were applied to the:  for  minutes, including:      neuromuscular re-education activities to improve: Balance, Coordination, Kinesthetic, Proprioception, and Posture for 30 minutes. The following activities were included:  Cybex back extension 3 x 10 4#  Cybex hip abduction 2pl x 20 bilateral  Cybex hip flexion 2pl x 20 bilateral   Cybex hip extension 2pl x 20 bilateral  Cybex abs 3pl x 30           (Not today)  Cybex leg press 3 x 10 6#  Bridges on swiss ball 2 x 10   Lower trunk rotation 2 x 10   (Not today)  Hamstring rolls 2 x 10   Clamshells ------      (Not  today)  supervised modalities after being cleared for contradictions: IFC Electrical Stimulation:  Sha received IFC Electrical Stimulation for pain control applied to the lumbar paraspinal areas in prone position per request. Pt received stimulation at 100% scan at a frequency of 80/150 Hz, 12.5-13.5 Volts CV for 0 minutes. Sha tolderated treatment well without any adverse effects during or after application.     Patient Education and Home Exercises       Education provided:   - received verbal and tactile cues to facilitate proper execution of exercises and body mechanics.    Written Home Exercises Provided:  yes . Exercises were reviewed and Sha was able to demonstrate them prior to the end of the session.  Sha demonstrated  understanding of the education provided. See EMR under Patient Instructions for exercises provided during therapy sessions    Assessment     Patient continue to have c/o pain on which ever leg he is standing. Completed hip machine into flexion/extension/abduction and added cybex abs. Completed increased repetitions. He did well with all exercises and will be progressed as able.     Sha Is progressing well towards his goals.   Pt prognosis is Good.     Pt will continue to benefit from skilled outpatient physical therapy to address the deficits listed in the problem list box on initial evaluation, provide pt/family education and to maximize pt's level of independence in the home and community environment.     Pt's spiritual, cultural and educational needs considered and pt agreeable to plan of care and goals.     Anticipated barriers to physical therapy: none      Goals:  Short Term Goals: 6 weeks   Patient will improve manual muscle test to 5/5 for posterior chain stability  Patient will maintain prolonged positions x 15 minutes with 2 /10 pain     Long Term Goals: 12 weeks   Patient will improve manual muscle test to have no juddering with active forward bend in core  musculature  Patient will maintain prolonged positions x 30 minutes with  0 /10 pain  Patient will be independent with home exercise program by D/C    Plan     Continue with current Plan of Care.    Plan of care Certification: 7/10/2024 to 10/2/24.     Outpatient Physical Therapy 2 times weekly for 12 weeks to include the following interventions: Aquatic Therapy, Hybresis with Dex, Cervical/Lumbar Traction, Electrical Stimulation IFC/Premod, Gait Training, Manual Therapy, Moist Heat/ Ice, Neuromuscular Re-ed, Patient Education, Self Care, Therapeutic Activities, Therapeutic Exercise, Ultrasound, and Dry Needling.  Hellen Lozano, PTA

## 2024-08-06 ENCOUNTER — CLINICAL SUPPORT (OUTPATIENT)
Dept: REHABILITATION | Facility: HOSPITAL | Age: 29
End: 2024-08-06

## 2024-08-06 DIAGNOSIS — R52 PAIN: Primary | ICD-10-CM

## 2024-08-06 DIAGNOSIS — R53.1 DECREASED STRENGTH: ICD-10-CM

## 2024-08-06 PROCEDURE — 97112 NEUROMUSCULAR REEDUCATION: CPT | Mod: CQ

## 2024-08-06 PROCEDURE — 97110 THERAPEUTIC EXERCISES: CPT | Mod: CQ

## 2024-08-08 ENCOUNTER — CLINICAL SUPPORT (OUTPATIENT)
Dept: REHABILITATION | Facility: HOSPITAL | Age: 29
End: 2024-08-08

## 2024-08-08 DIAGNOSIS — R52 PAIN: Primary | ICD-10-CM

## 2024-08-08 DIAGNOSIS — R53.1 DECREASED STRENGTH: ICD-10-CM

## 2024-08-08 PROCEDURE — 97110 THERAPEUTIC EXERCISES: CPT

## 2024-08-13 ENCOUNTER — CLINICAL SUPPORT (OUTPATIENT)
Dept: REHABILITATION | Facility: HOSPITAL | Age: 29
End: 2024-08-13

## 2024-08-13 DIAGNOSIS — R53.1 DECREASED STRENGTH: ICD-10-CM

## 2024-08-13 DIAGNOSIS — R52 PAIN: Primary | ICD-10-CM

## 2024-08-13 PROCEDURE — 97110 THERAPEUTIC EXERCISES: CPT

## 2024-08-13 PROCEDURE — 97112 NEUROMUSCULAR REEDUCATION: CPT

## 2024-08-13 NOTE — PROGRESS NOTES
[facility]   Physical Therapy Treatment Note      Name: Sha Goldberg  Clinic Number: [id number]    Therapy Diagnosis: Lumbar radiculopathy, chronic   Encounter Diagnoses   Name Primary?    Pain Yes    Decreased strength      Physician: Dl Hughes MD    Visit Date: 8/13/2024       Physician Orders: PT Eval and Treat   Medical Diagnosis from Referral: LBP  Evaluation Date: 7/10/2024  Authorization Period Expiration: 9/17/24  Plan of Care Expiration: 10/2/24  Visit # / Visits authorized: 10/ until 9/17 rush assistance 2/17    PTA Visit #: 2/5     Time In: 820 am  Time Out: 911 am  Total Billable Time:  51 minutes    Subjective     Pt reports: Did not sleep well last night and it is bothering me this am. Was sitting playing chess and had to stand to play. Been doing home exercise program. Got in pool and last week and it did not have pain.       He  will be  compliant with home exercise program.  Response to previous treatment: eval  Functional change: still high pain level with low back     Pain: 8/10   Location: bilateral back      Objective          Treatment     Sha received the treatments listed below:      therapeutic exercises to develop strength, endurance, and ROM for 21 minutes including:   Nustep x 5 min  Slant board stretch 3 x 30 s/h  Standing Hamstring stretch 3 x 30 s/h  Ball roll outs 5 x 5 second hold   Sitting piriformis stretch       neuromuscular re-education activities to improve: Balance, Coordination, Kinesthetic, Proprioception, and Posture for 30 minutes. The following activities were included:  Cybex back extension 4 pls 3 x 10   Cybex hip abduction 2pls x 20 bilateral  Cybex hip flexion 2pls x 20 bilateral   Cybex hip extension 2pls x 20 bilateral   Cybex abs 3pls x 30 NTV       (Not today)  Mechanical lumbar 90/90 traction x 2 minutes 45/15 rest 55 lb pull (stopped - increased pain)    (Not today)  MHP x 10 minutes in prone to the low back     manual  therapy techniques: Joint mobilizations, Manual traction, Myofacial release, and Soft tissue Mobilization were applied to the:  for  minutes, including:  (Not today)  Cybex leg press 3 x 10 6#  Bridges on swiss ball 2 x 10   Lower trunk rotation 2 x 10   (Not today)  Hamstring rolls 2 x 10   Clamshells ------      (Not today)  supervised modalities after being cleared for contradictions: IFC Electrical Stimulation:  Sha received IFC Electrical Stimulation for pain control applied to the lumbar paraspinal areas in prone position per request. Pt received stimulation at 100% scan at a frequency of 80/150 Hz, 12.5-13.5 Volts CV for 0 minutes. Sha tolderated treatment well without any adverse effects during or after application.     Patient Education and Home Exercises       Education provided:   - received verbal and tactile cues to facilitate proper execution of exercises and body mechanics.    Written Home Exercises Provided:  yes . Exercises were reviewed and Sha was able to demonstrate them prior to the end of the session.  Sha demonstrated  understanding of the education provided. See EMR under Patient Instructions for exercises provided during therapy sessions    Assessment     Patient has had no change in status but would like to continue with physical therapy. Required cueing for proper body mechanics during hip extension exercise. Will attempt aquatic therapy for the next two sessions secondary to patient stating he got in the pool at a friends house and did not have pain. Traction is not indicated.       Sha Is progressing well towards his goals.   Pt prognosis is Good.     Pt will continue to benefit from skilled outpatient physical therapy to address the deficits listed in the problem list box on initial evaluation, provide pt/family education and to maximize pt's level of independence in the home and community environment.     Pt's spiritual, cultural and educational needs considered and pt  agreeable to plan of care and goals.     Anticipated barriers to physical therapy: none      Goals:  Short Term Goals: 6 weeks   Patient will improve manual muscle test to 5/5 for posterior chain stability  Patient will maintain prolonged positions x 15 minutes with 2 /10 pain     Long Term Goals: 12 weeks   Patient will improve manual muscle test to have no juddering with active forward bend in core musculature  Patient will maintain prolonged positions x 30 minutes with  0 /10 pain  Patient will be independent with home exercise program by D/C    Plan     Continue with stabilization/strengthening and stretching. Generalized conditioning exercises with focus on core. Progress toward goals    Plan of care Certification: 7/10/2024 to 10/2/24.     Outpatient Physical Therapy 2 times weekly for 12 weeks to include the following interventions: Aquatic Therapy, Hybresis with Dex, Cervical/Lumbar Traction, Electrical Stimulation IFC/Premod, Gait Training, Manual Therapy, Moist Heat/ Ice, Neuromuscular Re-ed, Patient Education, Self Care, Therapeutic Activities, Therapeutic Exercise, Ultrasound, and Dry Needling.    BERTA WHITFIELD, PT

## 2024-08-15 ENCOUNTER — CLINICAL SUPPORT (OUTPATIENT)
Dept: REHABILITATION | Facility: HOSPITAL | Age: 29
End: 2024-08-15

## 2024-08-15 DIAGNOSIS — R52 PAIN: Primary | ICD-10-CM

## 2024-08-15 DIAGNOSIS — R53.1 DECREASED STRENGTH: ICD-10-CM

## 2024-08-15 PROCEDURE — 97112 NEUROMUSCULAR REEDUCATION: CPT | Mod: CQ

## 2024-08-15 PROCEDURE — 97110 THERAPEUTIC EXERCISES: CPT | Mod: CQ

## 2024-08-15 NOTE — PROGRESS NOTES
[facility]   Physical Therapy Treatment Note      Name: Sha Goldberg  Clinic Number: [id number]    Therapy Diagnosis: Lumbar radiculopathy, chronic   Encounter Diagnoses   Name Primary?    Pain Yes    Decreased strength      Physician: Dl Hughes MD    Visit Date: 8/15/2024       Physician Orders: PT Eval and Treat   Medical Diagnosis from Referral: LBP  Evaluation Date: 7/10/2024  Authorization Period Expiration: 9/17/24  Plan of Care Expiration: 10/2/24  Visit # / Visits authorized: 11/ until 9/17 rush assistance 2/17    PTA Visit #: 1/5     Time In: 2:30 PM  Time Out: 3:00 PM   Total Billable Time: 30  minutes    Subjective     Pt reports: I stayed with my cousin and I got in the pool and it felt really good.     He  will be  compliant with home exercise program.  Response to previous treatment: eval  Functional change: still high pain level with low back     Pain: 8/10   Location: bilateral back      Objective          Treatment     Sha received the treatments listed below:      therapeutic exercises to develop strength, endurance, and ROM for 15 minutes including:   Nustep x 5 min  Slant board stretch 3 x 30 s/h  Standing Hamstring stretch 3 x 30 s/h  Ball roll outs 5 x 5 second hold   Sitting piriformis stretch       neuromuscular re-education activities to improve: Balance, Coordination, Kinesthetic, Proprioception, and Posture for 15 minutes. The following activities were included:  Cybex back extension 4 pls 3 x 10   Cybex hip abduction 2pls x 20 bilateral  Cybex hip flexion 2pls x 20 bilateral ntv  Cybex hip extension 2pls x 20 bilateral   Cybex abs 3pls x 30 NTV       (Not today)  Mechanical lumbar 90/90 traction x 2 minutes 45/15 rest 55 lb pull (stopped - increased pain)    (Not today)  MHP x 10 minutes in prone to the low back     manual therapy techniques: Joint mobilizations, Manual traction, Myofacial release, and Soft tissue Mobilization were applied to  the:  for  minutes, including:  (Not today)  Cybex leg press 3 x 10 6#  Bridges on swiss ball 2 x 10   Lower trunk rotation 2 x 10   (Not today)  Hamstring rolls 2 x 10   Clamshells ------      (Not today)  supervised modalities after being cleared for contradictions: IFC Electrical Stimulation:  Sha received IFC Electrical Stimulation for pain control applied to the lumbar paraspinal areas in prone position per request. Pt received stimulation at 100% scan at a frequency of 80/150 Hz, 12.5-13.5 Volts CV for 0 minutes. Sha tolderated treatment well without any adverse effects during or after application.     Patient Education and Home Exercises       Education provided:   - received verbal and tactile cues to facilitate proper execution of exercises and body mechanics.    Written Home Exercises Provided:  yes . Exercises were reviewed and Sha was able to demonstrate them prior to the end of the session.  Sha demonstrated  understanding of the education provided. See EMR under Patient Instructions for exercises provided during therapy sessions    Assessment     Patient has had no change in status but would like to continue with physical therapy. Required cueing for proper body mechanics during hip extension exercise. Patient only completed 30 minutes today secondary to getting cramps while doing Cybex hip machines. Will attempt aquatic therapy for the next two sessions secondary to patient stating he got in the pool at a friends house and did not have pain. Traction is not indicated.       Sha Is progressing well towards his goals.   Pt prognosis is Good.     Pt will continue to benefit from skilled outpatient physical therapy to address the deficits listed in the problem list box on initial evaluation, provide pt/family education and to maximize pt's level of independence in the home and community environment.     Pt's spiritual, cultural and educational needs considered and pt agreeable to plan of  care and goals.     Anticipated barriers to physical therapy: none      Goals:  Short Term Goals: 6 weeks   Patient will improve manual muscle test to 5/5 for posterior chain stability  Patient will maintain prolonged positions x 15 minutes with 2 /10 pain     Long Term Goals: 12 weeks   Patient will improve manual muscle test to have no juddering with active forward bend in core musculature  Patient will maintain prolonged positions x 30 minutes with  0 /10 pain  Patient will be independent with home exercise program by D/C    Plan     Continue with stabilization/strengthening and stretching. Generalized conditioning exercises with focus on core. Progress toward goals    Plan of care Certification: 7/10/2024 to 10/2/24.     Outpatient Physical Therapy 2 times weekly for 12 weeks to include the following interventions: Aquatic Therapy, Hybresis with Dex, Cervical/Lumbar Traction, Electrical Stimulation IFC/Premod, Gait Training, Manual Therapy, Moist Heat/ Ice, Neuromuscular Re-ed, Patient Education, Self Care, Therapeutic Activities, Therapeutic Exercise, Ultrasound, and Dry Needling.    Hellen Lozano, PTA

## 2024-08-20 ENCOUNTER — CLINICAL SUPPORT (OUTPATIENT)
Dept: REHABILITATION | Facility: HOSPITAL | Age: 29
End: 2024-08-20

## 2024-08-20 DIAGNOSIS — R52 PAIN: Primary | ICD-10-CM

## 2024-08-20 DIAGNOSIS — R53.1 DECREASED STRENGTH: ICD-10-CM

## 2024-08-20 PROCEDURE — 97113 AQUATIC THERAPY/EXERCISES: CPT | Mod: CQ

## 2024-08-20 NOTE — PROGRESS NOTES
[facility]   Physical Therapy Treatment Note      Name: Sha Goldberg  Clinic Number: [id number]    Therapy Diagnosis: Lumbar radiculopathy, chronic   Encounter Diagnoses   Name Primary?    Pain Yes    Decreased strength      Physician: Dl Hughes MD    Visit Date: 8/20/2024       Physician Orders: PT Eval and Treat   Medical Diagnosis from Referral: LBP  Evaluation Date: 7/10/2024  Authorization Period Expiration: 9/17/24  Plan of Care Expiration: 10/2/24  Visit # / Visits authorized: 12/ until 9/17 rush assistance 2/17    PTA Visit #: 2/5     Time In: 9:10 am  Time Out: 9:50 am   Total Billable Time: 40 minutes    Subjective     Pt reports: Pain is not to bad today.     He  will be  compliant with home exercise program.  Response to previous treatment: eval  Functional change: pain has decreased since last session    Pain: 4/10   Location: bilateral back      Objective          Treatment     Sha received the treatments listed below:      Aquatic Therapy x 40 minutes  Laps x 4   Squats 3 x 10   Standing Hip abduction 3 x 10   Standing hip extension 3 x 10   Standing marches 3 x 10   Standing hamstring curls 3 x 10  Bicycle 3 minutes  Scissors 3 x 10   Seated Long arc quad 3 x 10   Hamstring stretch 4 x 15 second hold         (Not Today)  therapeutic exercises to develop strength, endurance, and ROM for 15 minutes including:   Nustep x 5 min  Slant board stretch 3 x 30 s/h  Standing Hamstring stretch 3 x 30 s/h  Ball roll outs 5 x 5 second hold   Sitting piriformis stretch       neuromuscular re-education activities to improve: Balance, Coordination, Kinesthetic, Proprioception, and Posture for 15 minutes. The following activities were included:  Cybex back extension 4 pls 3 x 10   Cybex hip abduction 2pls x 20 bilateral  Cybex hip flexion 2pls x 20 bilateral ntv  Cybex hip extension 2pls x 20 bilateral   Cybex abs 3pls x 30 NTV       (Not today)  Mechanical lumbar 90/90  traction x 2 minutes 45/15 rest 55 lb pull (stopped - increased pain)    (Not today)  MHP x 10 minutes in prone to the low back     manual therapy techniques: Joint mobilizations, Manual traction, Myofacial release, and Soft tissue Mobilization were applied to the:  for  minutes, including:  (Not today)  Cybex leg press 3 x 10 6#  Bridges on swiss ball 2 x 10   Lower trunk rotation 2 x 10   (Not today)  Hamstring rolls 2 x 10   Clamshells ------      (Not today)  supervised modalities after being cleared for contradictions: IFC Electrical Stimulation:  Sha received IFC Electrical Stimulation for pain control applied to the lumbar paraspinal areas in prone position per request. Pt received stimulation at 100% scan at a frequency of 80/150 Hz, 12.5-13.5 Volts CV for 0 minutes. Sha tolderated treatment well without any adverse effects during or after application.     Patient Education and Home Exercises       Education provided:   - received verbal and tactile cues to facilitate proper execution of exercises and body mechanics.    Written Home Exercises Provided:  yes . Exercises were reviewed and Sha was able to demonstrate them prior to the end of the session.  Sha demonstrated  understanding of the education provided. See EMR under Patient Instructions for exercises provided during therapy sessions    Assessment     Patient arrived to therapy with decreased pain today. Today was his first time perform aquatic therapy. Her entered and exited via stairs. He did well today and did nit have any complaints of pian. Will progress patient as tolerated.     Sha Is progressing well towards his goals.   Pt prognosis is Good.     Pt will continue to benefit from skilled outpatient physical therapy to address the deficits listed in the problem list box on initial evaluation, provide pt/family education and to maximize pt's level of independence in the home and community environment.     Pt's spiritual, cultural  and educational needs considered and pt agreeable to plan of care and goals.     Anticipated barriers to physical therapy: none      Goals:  Short Term Goals: 6 weeks   Patient will improve manual muscle test to 5/5 for posterior chain stability  Patient will maintain prolonged positions x 15 minutes with 2 /10 pain     Long Term Goals: 12 weeks   Patient will improve manual muscle test to have no juddering with active forward bend in core musculature  Patient will maintain prolonged positions x 30 minutes with  0 /10 pain  Patient will be independent with home exercise program by D/C    Plan     Continue with stabilization/strengthening and stretching. Generalized conditioning exercises with focus on core. Progress toward goals    Plan of care Certification: 7/10/2024 to 10/2/24.     Outpatient Physical Therapy 2 times weekly for 12 weeks to include the following interventions: Aquatic Therapy, Hybresis with Dex, Cervical/Lumbar Traction, Electrical Stimulation IFC/Premod, Gait Training, Manual Therapy, Moist Heat/ Ice, Neuromuscular Re-ed, Patient Education, Self Care, Therapeutic Activities, Therapeutic Exercise, Ultrasound, and Dry Needling.    Hellen Lozano, PTA

## 2024-08-22 ENCOUNTER — CLINICAL SUPPORT (OUTPATIENT)
Dept: REHABILITATION | Facility: HOSPITAL | Age: 29
End: 2024-08-22

## 2024-08-22 DIAGNOSIS — R52 PAIN: Primary | ICD-10-CM

## 2024-08-22 DIAGNOSIS — R53.1 DECREASED STRENGTH: ICD-10-CM

## 2024-08-22 PROCEDURE — 97113 AQUATIC THERAPY/EXERCISES: CPT

## 2024-08-22 NOTE — PROGRESS NOTES
[facility]   Physical Therapy Treatment Note / Discharge Summary     Name: Sha Goldberg  Clinic Number: [id number]    Therapy Diagnosis: Lumbar radiculopathy, chronic   Encounter Diagnoses   Name Primary?    Pain Yes    Decreased strength      Physician: Dl Hughes MD    Visit Date: 8/22/2024       Physician Orders: PT Eval and Treat   Medical Diagnosis from Referral: LBP  Evaluation Date: 7/10/2024  Authorization Period Expiration: 9/17/24  Plan of Care Expiration: 10/2/24  Visit # / Visits authorized: 13/ until 9/17 rush assistance 2/17    PTA Visit #: 2/5     Time In: 915 am  Time Out: 1000  am   Total Billable Time: 45 minutes    Subjective     Pt reports: Pain is not to bad today. Have some tingling in the low back. Cleaned the room yesterday and may have done too much. The pool exercises seems to be more tolerable than the gym.     He  will be  compliant with home exercise program.  Response to previous treatment: eval  Functional change: pain has decreased since last session    Pain: 5/10   Location: bilateral back      Objective      Eval FOTO: 49  D/C FOTO: 52    Treatment     Sha received the treatments listed below:      Aquatic Therapy x 45 minutes  Laps x 5  Squats 3 x 10   Standing Hip abduction bilateral  3 x 10   Standing hip extension bilateral 3 x 10   Standing marches bilateral 3 x 10   Standing hamstring curls bilateral 3 x 10  Bicycle 3 minutes  Scissors 3 x 10   Seated Long arc quad 3 x 10 NTV  Hamstring stretch 4 x 15 second hold         (Not Today)  therapeutic exercises to develop strength, endurance, and ROM for 15 minutes including:   Nustep x 5 min  Slant board stretch 3 x 30 s/h  Standing Hamstring stretch 3 x 30 s/h  Ball roll outs 5 x 5 second hold   Sitting piriformis stretch     (Not today)  neuromuscular re-education activities to improve: Balance, Coordination, Kinesthetic, Proprioception, and Posture for 0 minutes. The following  activities were included:  Cybex back extension 4 pls 3 x 10   Cybex hip abduction 2pls x 20 bilateral  Cybex hip flexion 2pls x 20 bilateral ntv  Cybex hip extension 2pls x 20 bilateral   Cybex abs 3pls x 30 NTV     (Not today)  Mechanical lumbar 90/90 traction x 0 minutes 45/15 rest 55 lb pull (stopped - increased pain)    (Not today)  MHP x 10 minutes in prone to the low back     manual therapy techniques: Joint mobilizations, Manual traction, Myofacial release, and Soft tissue Mobilization were applied to the:  for  minutes, including:  (Not today)  Cybex leg press 3 x 10 6#  Bridges on swiss ball 2 x 10   Lower trunk rotation 2 x 10   (Not today)  Hamstring rolls 2 x 10   Clamshells ------    (Not today)  supervised modalities after being cleared for contradictions: IFC Electrical Stimulation:  Sha received IFC Electrical Stimulation for pain control applied to the lumbar paraspinal areas in prone position per request. Pt received stimulation at 100% scan at a frequency of 80/150 Hz, 12.5-13.5 Volts CV for 0 minutes. Sha tolderated treatment well without any adverse effects during or after application.     Patient Education and Home Exercises       Education provided:   - received verbal and tactile cues to facilitate proper execution of exercises and body mechanics.    Written Home Exercises Provided:  yes . Exercises were reviewed and Sha was able to demonstrate them prior to the end of the session.  Sha demonstrated  understanding of the education provided. See EMR under Patient Instructions for exercises provided during therapy sessions    Assessment     Patient arrived stating the back has tingling after he did too much yesterday with moving items in his room. Patient has had minimal to no change in pain status and has no improvement overall with physical therapy. The next steps in care will need to be assessed and patient plans to return to MD for further advice. Eentered and exited via  stairs. Patient is discharged from therapy.      Sha Is progressing well towards his goals.   Pt prognosis is Good.     Pt will continue to benefit from skilled outpatient physical therapy to address the deficits listed in the problem list box on initial evaluation, provide pt/family education and to maximize pt's level of independence in the home and community environment.     Pt's spiritual, cultural and educational needs considered and pt agreeable to plan of care and goals.     Anticipated barriers to physical therapy: none    All Goals Not Met      Goals:  Short Term Goals: 6 weeks   Patient will improve manual muscle test to 5/5 for posterior chain stability  Patient will maintain prolonged positions x 15 minutes with 2 /10 pain     Long Term Goals: 12 weeks   Patient will improve manual muscle test to have no juddering with active forward bend in core musculature  Patient will maintain prolonged positions x 30 minutes with  0 /10 pain  Patient will be independent with home exercise program by D/C        Discharge reason: Patient has reached the maximum rehab potential for the present time. Patient has not made any gains with subjective pain status. Remains in high pain level with any activity other than light movement.     Plan   This patient is discharged from Physical Therapy.    Date of Last visit: 8/22/24  Total Visits Received: 13      BERTA WHITFIELD, PT

## 2024-09-05 ENCOUNTER — OFFICE VISIT (OUTPATIENT)
Dept: SPINE | Facility: CLINIC | Age: 29
End: 2024-09-05

## 2024-09-05 DIAGNOSIS — M54.50 ACUTE BILATERAL LOW BACK PAIN WITHOUT SCIATICA: Primary | ICD-10-CM

## 2024-09-05 PROCEDURE — 99213 OFFICE O/P EST LOW 20 MIN: CPT | Mod: PBBFAC | Performed by: ORTHOPAEDIC SURGERY

## 2024-09-05 PROCEDURE — 99214 OFFICE O/P EST MOD 30 MIN: CPT | Mod: S$PBB,,, | Performed by: ORTHOPAEDIC SURGERY

## 2024-09-05 PROCEDURE — 99999 PR PBB SHADOW E&M-EST. PATIENT-LVL III: CPT | Mod: PBBFAC,,, | Performed by: ORTHOPAEDIC SURGERY

## 2024-09-05 RX ORDER — IBUPROFEN 800 MG/1
800 TABLET ORAL 3 TIMES DAILY
Qty: 90 TABLET | Refills: 11 | Status: SHIPPED | OUTPATIENT
Start: 2024-09-05

## 2024-09-05 NOTE — PROGRESS NOTES
MDM/time:  30-35 minutes spent on this encounter including 10 minutes reviewing imaging and notes, 15 minutes with the patient, 5 minutes documentation    ASSESSMENT:  28 y.o. male with low back pain    PLAN:  Ibuprofen 800.  Pain referral.  Follow-up as needed    HPI:  28 y.o. male here for repeat evaluation of lower back pain.  He has completed physical therapy without relief.  He has been doing his home exercise program.  Reports his back pain is still about the same as it was before.  Denies any bilateral lower extremity radicular complaints.  Patient reports he was in the  doing physical exercises and feels that he may have pulled a muscle in his back on May 7, 2024.  He was seen by medical diagnosed with possible pulled muscle treated with pain patches later saw a civilian doctor who thought he may have a fracture in his lumbar spine.  Unsure if fracture was old or new.  Denies any prior injuries.  No difficulty with  strength.  No issues with balance or falls.  Denies bladder bowel incontinence.  No difficulty walking distances.  Currently takes ibuprofen as needed for pain.  No prior pain management.  No prior spine surgery.  Patient is not a smoker.    IMAGING:  X-rays lumbar spine reviewed show:   On the AP there is normal coronal alignment.  There are 5 non-rib-bearing lumbar vertebrae.  There is transitional anatomy with partial sacralization of L5 on the left.  On the lateral there is maintained lumbar lordosis.  There is mild spondylotic disease with decreased disc height and osteophyte formation noted.  No fractures or listhesis noted.  No instability on flexion-extension views.    MRI lumbar spine 06/27/2024 reviewed shows:  No fractures noted.  At L3-4 there is a left lateral disc protrusion with moderate left foraminal stenosis    Past Medical History:   Diagnosis Date    Acne     Hypertension      No past surgical history on file.  Social History     Tobacco Use    Smoking status:  Never    Smokeless tobacco: Never   Substance Use Topics    Alcohol use: Never    Drug use: Never      Current Outpatient Medications   Medication Instructions    azithromycin (ZITHROMAX Z-UMU) 250 MG tablet As directed    cyclobenzaprine (FLEXERIL) 5 mg, 2 times daily    ibuprofen (ADVIL,MOTRIN) 600 mg, 3 times daily    LIDOcaine (LIDODERM) 5 % 1 patch        EXAM:  Constitutional  General Appearance:  There is no height or weight on file to calculate BMI., NAD  Psychiatric   Orientation: Oriented to time, oriented to place, oriented to person  Mood and Affect: Active and alert, normal mood, normal affect  Gait and Station   Appearance:  Antalgic gait, normal tandem gait, able to walk on toes, able to walk on heels    LUMBAR  Musculoskeletal System   Hips: Normal appearance, no leg length discrepancy, normal motion; left, normal motion; right    Lumbar Spine                   Inspection:  Kyphotic alignment in the thoracic spine, normal sagittal balance                  Range of motion:  Decreased flexion, extension, lateral bending, rotation. Pain with range of motion                  Bony Palpation of the Lumbar Spine:  No tenderness of the spinous process, no tenderness of the sacrum, no tenderness of the coccyx                  Bony Palpation of the Right Hip:  No tenderness of the iliac crest, no tenderness of the sciatic notch, no tenderness of the SI joint                  Bony Palpation of the Left Hip:  No tenderness of the iliac crest, no tenderness of the sciatic notch, no tenderness of the SI joint                  Soft Tissue Palpation on the Right:  + tenderness of the paraspinal region, no tenderness of the iliolumbar region                  Soft Tissue Palpation on the Left:  + tenderness of the paraspinal region, no tenderness of the iliolumbar region    Motor Strength   L1 Right:  Hip flexion iliopsoas 5/5    L1 Left:  Hip flexion iliopsoas 5/5              L2-L4 Right:  Knee extension quadriceps  5/5, tibialis anterior 5/5              L2-L4 Left:  Knee extension quadriceps 5/5, tibialis anterior 5/5   L5 Right:  Extensor hallucis llongus 5/5,    L5 Left:  Extensor hallucis longus 5/5,    S1 Right:  Plantar flexion gastrocnemius 5/5   S1 Left:  Plantar flexion gastrocnemius 5/5    Neurological System   Ankle Reflex Right:  normal   Ankle Reflex Left: normal   Knee Reflex Right:  normal   Knee Reflex Left:  normal   Sensation on the Right:  L2 normal, L3 normal, L4 normal, L5 normal, S1 normal   Sensation on the Left:  L2 normal, L3 normal, L4 normal, L5 normal, S1 normal              Special Test on the Right:  Seated straight leg raising test negative, no clonus of the ankle              Special Test on the Left:  Seated straight leg raising test negative, no clonus of the ankle    Skin   Lumbosacral Spine:  Normal skin    Cardiovascular System   Arterial Pulses Right:  Posterior tibialis normal, dorsalis pedis normal   Arterial Pulses Left:  Posterior tibialis normal, dorsalis pedis normal   Edema Right: None   Edema Left:  None

## 2024-10-08 NOTE — PROGRESS NOTES
Subjective:         Patient ID: Sha oGldberg is a 28 y.o. male.    Chief Complaint: Back Pain      Pain  This is a chronic problem. The current episode started more than 1 year ago. The problem occurs daily. The problem has been waxing and waning. Associated symptoms include arthralgias. Pertinent negatives include no anorexia, change in bowel habit, chest pain, chills, coughing, diaphoresis, fever, neck pain, rash, sore throat, swollen glands, urinary symptoms, vertigo or vomiting.     Review of Systems   Constitutional:  Negative for activity change, appetite change, chills, diaphoresis, fever and unexpected weight change.   HENT:  Negative for drooling, ear discharge, ear pain, facial swelling, nosebleeds, sore throat, trouble swallowing, voice change and goiter.    Eyes:  Negative for photophobia, pain, discharge, redness and visual disturbance.   Respiratory:  Negative for apnea, cough, choking, chest tightness, shortness of breath, wheezing and stridor.    Cardiovascular:  Negative for chest pain, palpitations and leg swelling.   Gastrointestinal:  Negative for abdominal distention, anorexia, change in bowel habit, diarrhea, rectal pain, vomiting and fecal incontinence.   Endocrine: Negative for cold intolerance, heat intolerance, polydipsia, polyphagia and polyuria.   Genitourinary:  Negative for bladder incontinence, dysuria, flank pain and frequency.   Musculoskeletal:  Positive for arthralgias, back pain and leg pain. Negative for neck pain.   Integumentary:  Negative for color change, pallor and rash.   Neurological:  Negative for dizziness, vertigo, seizures, syncope, facial asymmetry, speech difficulty, light-headedness, memory loss and coordination difficulties.   Hematological:  Negative for adenopathy. Does not bruise/bleed easily.   Psychiatric/Behavioral:  Negative for agitation, behavioral problems, confusion, decreased concentration, dysphoric mood, hallucinations, self-injury and  "suicidal ideas. The patient is not nervous/anxious and is not hyperactive.            Past Medical History:   Diagnosis Date    Acne     Hypertension      History reviewed. No pertinent surgical history.  Social History     Socioeconomic History    Marital status: Single   Tobacco Use    Smoking status: Never    Smokeless tobacco: Never   Substance and Sexual Activity    Alcohol use: Never    Drug use: Never    Sexual activity: Not Currently     No family history on file.  Review of patient's allergies indicates:  No Known Allergies     Objective:  Vitals:    10/14/24 1322 10/14/24 1324   BP: (!) 141/87    Pulse: 81    Weight: 99.3 kg (219 lb)    Height: 5' 8" (1.727 m)    PainSc:    6         Physical Exam  Vitals and nursing note reviewed. Exam conducted with a chaperone present.   Constitutional:       General: He is awake. He is not in acute distress.     Appearance: Normal appearance. He is not ill-appearing, toxic-appearing or diaphoretic.   HENT:      Head: Normocephalic and atraumatic.      Nose: Nose normal.      Mouth/Throat:      Mouth: Mucous membranes are moist.      Pharynx: Oropharynx is clear.   Eyes:      Conjunctiva/sclera: Conjunctivae normal.      Pupils: Pupils are equal, round, and reactive to light.   Cardiovascular:      Rate and Rhythm: Normal rate.   Pulmonary:      Effort: Pulmonary effort is normal. No respiratory distress.   Abdominal:      Palpations: Abdomen is soft.      Tenderness: There is no guarding.   Musculoskeletal:         General: Normal range of motion.      Cervical back: Normal range of motion and neck supple. No rigidity.   Skin:     General: Skin is warm and dry.      Coloration: Skin is not jaundiced or pale.   Neurological:      General: No focal deficit present.      Mental Status: He is alert and oriented to person, place, and time. Mental status is at baseline.      Cranial Nerves: No cranial nerve deficit (II-XII).   Psychiatric:         Mood and Affect: Mood " normal.         Behavior: Behavior normal. Behavior is cooperative.         Thought Content: Thought content normal.           MRI Lumbar Spine Without Contrast  Narrative: EXAMINATION:  MRI LUMBAR SPINE WITHOUT CONTRAST    CLINICAL HISTORY:  Lumbar radiculopathy, symptoms persist with conservative treatment; Radiculopathy, lumbar region    TECHNIQUE:  Axial and sagittal imaging of the spine is performed using T1, T2, STIR and T2 fat sat sequences without contrast.    COMPARISON:  None available    FINDINGS:  Vertebral bodies are normal in height and alignment.    Disc space heights are well-maintained.    L3-4: There is mild diffuse disc bulge with mild central canal and mild bilateral foraminal stenosis.    Remaining levels appear within normal limits.    Spinal cord signal appears within normal limits.    Osseous marrow signal appears within normal limits.  Impression: Degenerative disc disease as described above.    Electronically signed by: Zak Hogue  Date:    06/27/2024  Time:    09:16       Office Visit on 06/18/2024   Component Date Value Ref Range Status    Color, UA 06/18/2024 Yellow   Final    Spec Grav UA 06/18/2024 1.015   Final    pH, UA 06/18/2024 7.0   Final    WBC, UA 06/18/2024 negative   Final    Nitrite, UA 06/18/2024 negative   Final    Protein, POC 06/18/2024 negative   Final    Glucose, UA 06/18/2024 negative   Final    Ketones, UA 06/18/2024 negative   Final    Bilirubin, POC 06/18/2024 negative   Final    Urobilinogen, UA 06/18/2024 0.2   Final    Blood, UA 06/18/2024 negative   Final         Orders Placed This Encounter   Procedures    Controlled Substance Monitoring Panel, Random, Urine     Standing Status:   Future     Number of Occurrences:   1     Standing Expiration Date:   12/13/2025     Order Specific Question:   Send normal result to authorizing provider's In Basket if patient is active on MyChart:     Answer:   Yes    POCT Urine Drug Screen Presump     Interpretive  Information:     Negative:  No drug detected at the cut off level.   Positive:  This result represents presumptive positive for the   tested drug, other substances may yield a positive response other   than the analyte of interest. This result should be utilized for   diagnostic purpose only. Confirmation testing will be performed upon physician request only.          Requested Prescriptions      No prescriptions requested or ordered in this encounter       Assessment:     1. Lumbosacral spondylosis without myelopathy    2. Encounter for long-term (current) use of medications         A's of Opioid Risk Assessment  Activity:Patient can perform ADL.   Analgesia:Patients pain is partially controlled by current medication. Patient has tried OTC medications such as Tylenol and Ibuprofen with out relief.   Adverse Effects: Patient denies constipation or sedation.  Aberrant Behavior:  reviewed with no aberrant drug seeking/taking behavior.  Overdose reversal drug naloxone discussed    Drug screen reviewed      Plan:    New patient     Spine surgery [facility] Dr. Smith  Clinic no September 5, 2024 reviewed possible pulled muscle back  Ibuprofen  Referral Pain Treatment  Follow-up as needed    Follows orthopedics [facility]    Completed physical therapy please see notes in epic through August 22, 2024    X-rays lumbar spine reviewed show:   On the AP there is normal coronal alignment.  There are 5 non-rib-bearing lumbar vertebrae.  There is transitional anatomy with partial sacralization of L5 on the left.  On the lateral there is maintained lumbar lordosis.  There is mild spondylotic disease with decreased disc height and osteophyte formation noted.  No fractures or listhesis noted.  No instability on flexion-extension views.     MRI lumbar spine 06/27/2024 reviewed shows:  No fractures noted.  At L3-4 there is a left lateral disc protrusion with moderate left foraminal stenosis      October 14, 2024    Remote  history back pain worse with flexion extension rotation lumbar spine ongoing for more than 3 months he states is mostly right-sided can be sharp stabbing at times he states it is improving with time     He states he completed physical therapy recently    No recent illness or injury    Denies loss of bowel or bladder function     Denies difficulty ambulating     Neurologically intact    He states ibuprofen is helping     After discussing options she would like to continue conservative management     Considering epidural steroid injection L3/4 if indicated    Continue NSAIDs as directed    Continue home exercise program as directed     Follow-up as needed, patient request    Dr. Neville    Bring original prescription medication bottles/container/box with labels to each visit    Greater than 30 minutes spent on this encounter including 10 minutes reviewing imaging and notes, 15 minutes with the patient, 5 minutes documentation

## 2024-10-14 ENCOUNTER — OFFICE VISIT (OUTPATIENT)
Dept: PAIN MEDICINE | Facility: CLINIC | Age: 29
End: 2024-10-14

## 2024-10-14 VITALS
HEART RATE: 81 BPM | WEIGHT: 219 LBS | HEIGHT: 68 IN | BODY MASS INDEX: 33.19 KG/M2 | DIASTOLIC BLOOD PRESSURE: 87 MMHG | SYSTOLIC BLOOD PRESSURE: 141 MMHG

## 2024-10-14 DIAGNOSIS — Z79.899 ENCOUNTER FOR LONG-TERM (CURRENT) USE OF MEDICATIONS: ICD-10-CM

## 2024-10-14 DIAGNOSIS — M47.817 LUMBOSACRAL SPONDYLOSIS WITHOUT MYELOPATHY: Primary | Chronic | ICD-10-CM

## 2024-10-14 LAB
CTP QC/QA: YES
POC (AMP) AMPHETAMINE: NEGATIVE
POC (BAR) BARBITURATES: NEGATIVE
POC (BUP) BUPRENORPHINE: NEGATIVE
POC (BZO) BENZODIAZEPINES: NEGATIVE
POC (COC) COCAINE: NEGATIVE
POC (MDMA) METHYLENEDIOXYMETHAMPHETAMINE 3,4: NEGATIVE
POC (MET) METHAMPHETAMINE: NEGATIVE
POC (MOP) OPIATES: NEGATIVE
POC (MTD) METHADONE: NEGATIVE
POC (OXY) OXYCODONE: NEGATIVE
POC (PCP) PHENCYCLIDINE: NEGATIVE
POC (TCA) TRICYCLIC ANTIDEPRESSANTS: NEGATIVE
POC TEMPERATURE (URINE): 90
POC THC: NEGATIVE

## 2024-10-14 PROCEDURE — 80305 DRUG TEST PRSMV DIR OPT OBS: CPT | Mod: PBBFAC | Performed by: PHYSICIAN ASSISTANT

## 2024-10-14 PROCEDURE — 99999 PR PBB SHADOW E&M-EST. PATIENT-LVL IV: CPT | Mod: PBBFAC,,, | Performed by: PHYSICIAN ASSISTANT

## 2024-10-14 PROCEDURE — 99204 OFFICE O/P NEW MOD 45 MIN: CPT | Mod: S$PBB,,, | Performed by: PHYSICIAN ASSISTANT

## 2024-10-14 PROCEDURE — 99214 OFFICE O/P EST MOD 30 MIN: CPT | Mod: PBBFAC | Performed by: PHYSICIAN ASSISTANT

## 2024-10-14 PROCEDURE — 99999PBSHW POCT URINE DRUG SCREEN PRESUMP: Mod: PBBFAC,,,

## 2024-10-14 RX ORDER — CYCLOBENZAPRINE HCL 5 MG
5 TABLET ORAL 3 TIMES DAILY PRN
Qty: 90 TABLET | Refills: 0 | Status: SHIPPED | OUTPATIENT
Start: 2024-10-14

## 2025-01-22 ENCOUNTER — OFFICE VISIT (OUTPATIENT)
Dept: FAMILY MEDICINE | Facility: CLINIC | Age: 30
End: 2025-01-22

## 2025-01-22 VITALS
OXYGEN SATURATION: 99 % | SYSTOLIC BLOOD PRESSURE: 137 MMHG | DIASTOLIC BLOOD PRESSURE: 78 MMHG | TEMPERATURE: 98 F | HEART RATE: 83 BPM
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
## 2025-01-22 DIAGNOSIS — L03.116 CELLULITIS OF LEFT LOWER EXTREMITY: Primary | ICD-10-CM

## 2025-01-22 DIAGNOSIS — S80.862A INSECT BITE (NONVENOMOUS), LEFT LOWER LEG, INITIAL ENCOUNTER: ICD-10-CM

## 2025-01-22 PROCEDURE — 96372 THER/PROPH/DIAG INJ SC/IM: CPT | Mod: ,,, | Performed by: NURSE PRACTITIONER

## 2025-01-22 PROCEDURE — 99213 OFFICE O/P EST LOW 20 MIN: CPT | Mod: 25,,, | Performed by: NURSE PRACTITIONER

## 2025-01-22 RX ORDER — CEFTRIAXONE 1 G/1
1 INJECTION, POWDER, FOR SOLUTION INTRAMUSCULAR; INTRAVENOUS
Status: COMPLETED | OUTPATIENT
Start: 2025-01-22 | End: 2025-01-22

## 2025-01-22 RX ORDER — MUPIROCIN 20 MG/G
OINTMENT TOPICAL 3 TIMES DAILY
Qty: 30 G | Refills: 1 | Status: SHIPPED | OUTPATIENT
Start: 2025-01-22

## 2025-01-22 RX ORDER — CEPHALEXIN 500 MG/1
500 CAPSULE ORAL EVERY 12 HOURS
Qty: 14 CAPSULE | Refills: 0 | Status: SHIPPED | OUTPATIENT
Start: 2025-01-22

## 2025-01-22 RX ADMIN — CEFTRIAXONE 1 G: 1 INJECTION, POWDER, FOR SOLUTION INTRAMUSCULAR; INTRAVENOUS at 03:01

## 2025-01-26 PROBLEM — L03.116 CELLULITIS OF LEFT LOWER EXTREMITY: Status: ACTIVE | Noted: 2025-01-26

## 2025-01-26 PROBLEM — S80.862A INSECT BITE (NONVENOMOUS), LEFT LOWER LEG, INITIAL ENCOUNTER: Status: ACTIVE | Noted: 2025-01-26
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
## 2025-01-27 NOTE — PROGRESS NOTES
[facility]  Chief Complaint      Chief Complaint   Patient presents with    Insect Bite     Occurred Sunday. Location left lower leg and above left knee. S/S: raised, red, and not painful.        History of Present Illness      Sha Goldberg is a 29 y.o. male  who presents today for evaluation of an insect bite to his left lower leg. The pt reports red raised lesions X 2 sites to left lower extremity. Onset 3 days ago.    Insect Bite  Pertinent negatives include no abdominal pain, chest pain, coughing, fatigue, fever, headaches, nausea, rash, vomiting or weakness.      Past Medical History:  Past Medical History:   Diagnosis Date    Acne     Hypertension        Past Surgical History:   has no past surgical history on file.    Social History:  Social History     Tobacco Use    Smoking status: Never    Smokeless tobacco: Never   Substance Use Topics    Alcohol use: Never    Drug use: Never       I personally reviewed all past medical, surgical, and social.     Review of Systems   Constitutional:  Negative for fatigue, fever and unexpected weight change.   Respiratory:  Negative for cough, shortness of breath and wheezing.    Cardiovascular:  Negative for chest pain and leg swelling.   Gastrointestinal:  Negative for abdominal pain, constipation, diarrhea, nausea and vomiting.   Genitourinary:  Negative for difficulty urinating and dysuria.   Skin:  Positive for color change and wound. Negative for rash.   Neurological:  Negative for dizziness, weakness and headaches.      Medications:  Outpatient Encounter Medications as of 1/22/2025   Medication Sig Dispense Refill    azithromycin (ZITHROMAX Z-UMU) 250 MG tablet As directed (Patient not taking: Reported on 6/26/2024) 1 tablet 0    cephALEXin (KEFLEX) 500 MG capsule Take 1 capsule (500 mg total) by mouth every 12 (twelve) hours. 14 capsule 0    cyclobenzaprine (FLEXERIL) 5 MG tablet Take 1 tablet (5 mg total) by mouth 3 (three)  times daily as needed for Muscle spasms. 90 tablet 0    ibuprofen (ADVIL,MOTRIN) 800 MG tablet Take 1 tablet (800 mg total) by mouth 3 (three) times daily. 90 tablet 11    LIDOcaine (LIDODERM) 5 % 1 patch. (Patient not taking: Reported on 2024)      mupirocin (BACTROBAN) 2 % ointment Apply topically 3 (three) times daily. 30 g 1    [] cefTRIAXone injection 1 g        No facility-administered encounter medications on file as of 2025.       Allergies:  Review of patient's allergies indicates:  No Known Allergies    Health Maintenance:    There is no immunization history on file for this patient.     Health Maintenance   Topic Date Due    Hepatitis C Screening  Never done    Lipid Panel  Never done    HIV Screening  Never done    TETANUS VACCINE  Never done    Influenza Vaccine (1) Never done    COVID-19 Vaccine ( season) Never done    RSV Vaccine (Age 60+ and Pregnant patients) (1 - 1-dose 75+ series) 10/22/2072    Pneumococcal Vaccines (Age 0-49)  Aged Out        Physical Exam      Vital Signs  Temp: 98 °F (36.7 °C)  Temp Source: Oral  Pulse: 83  SpO2: 99 %  BP: 137/78  BP Location: Right arm  Patient Position: Sitting]    Physical Exam  Vitals reviewed.   Constitutional:       Appearance: Normal appearance.   HENT:      Head: Normocephalic.      Mouth/Throat:      Mouth: Mucous membranes are moist.   Cardiovascular:      Rate and Rhythm: Normal rate and regular rhythm.      Pulses: Normal pulses.      Heart sounds: Normal heart sounds. No murmur heard.  Pulmonary:      Effort: Pulmonary effort is normal. No respiratory distress.      Breath sounds: Normal breath sounds. No wheezing, rhonchi or rales.   Abdominal:      Palpations: Abdomen is soft.      Tenderness: There is no abdominal tenderness.   Musculoskeletal:         General: Normal range of motion.      Cervical back: Neck supple.   Skin:     General: Skin is warm and dry.      Findings: Erythema and lesion present.      " Comments: X2 to left lower leg. Areas are red and indurated, nontender   Neurological:      Mental Status: He is alert and oriented to person, place, and time.   Psychiatric:         Mood and Affect: Mood normal.         Behavior: Behavior normal.        Laboratory:  CBC:  Recent Labs   Lab 07/20/22  1905 06/25/23  0947   WBC 11.57 H 7.62   RBC 4.85 5.13   Hemoglobin 14.7 14.9   Hematocrit 42.2 45.7   Platelet Count 313 289   MCV 87.0 89.1   MCH 30.3 29.0   MCHC 34.8 32.6       LIPIDS:  Recent Labs   Lab 06/25/23  0947   TSH 1.240       TSH:  Recent Labs   Lab 06/25/23  0947   TSH 1.240       A1C:  Recent Labs   Lab 06/25/23  0947   Hemoglobin A1C 5.0       Lab Results   Component Value Date    GLU 91 06/25/2023     06/25/2023    K 4.3 06/25/2023     (H) 06/25/2023    CO2 30 06/25/2023    BUN 8 06/25/2023    CREATININE 0.98 06/25/2023    CALCIUM 8.9 06/25/2023    PROT 7.1 06/25/2023    ALBUMIN 3.8 06/25/2023    BILITOT 0.4 06/25/2023    ALKPHOS 104 06/25/2023    AST 16 06/25/2023    ALT 28 06/25/2023    ANIONGAP 7 06/25/2023    EGFRNONAA 93 07/20/2022       Lab Results   Component Value Date    WBC 7.62 06/25/2023    RBC 5.13 06/25/2023    HGB 14.9 06/25/2023    HCT 45.7 06/25/2023    MCV 89.1 06/25/2023    RDW 13.0 06/25/2023     06/25/2023        No results found for: "CHOL", "TRIG", "HDL", "LDLCALC", "TOTALCHOLEST"    Lab Results   Component Value Date    TSH 1.240 06/25/2023       Lab Results   Component Value Date    HGBA1C 5.0 06/25/2023    ESTIMATEDAVG 81 06/25/2023        No components found for: "VITAMIND"    No results found for: "PSA"    Point Of Care Testing:  WBC, UA   Date Value Ref Range Status   06/18/2024 negative  Final     Nitrite, UA   Date Value Ref Range Status   06/18/2024 negative  Final     Urobilinogen, UA   Date Value Ref Range Status   06/18/2024 0.2  Final     Protein, POC   Date Value Ref Range Status   06/18/2024 negative  Final     pH, UA   Date Value Ref Range " Status   06/18/2024 7.0  Final     Spec Grav UA   Date Value Ref Range Status   06/18/2024 1.015  Final     Ketones, UA   Date Value Ref Range Status   06/18/2024 negative  Final     Bilirubin, POC   Date Value Ref Range Status   06/18/2024 negative  Final     Glucose, UA   Date Value Ref Range Status   06/18/2024 negative  Final       Lab Results   Component Value Date    [id number] Negative 02/04/2022    RAPFLUA Negative 10/25/2022    RAPFLUB Negative 10/25/2022         Assessment/Plan     1. Cellulitis of left lower extremity  -     cefTRIAXone injection 1 g  -     cephALEXin (KEFLEX) 500 MG capsule; Take 1 capsule (500 mg total) by mouth every 12 (twelve) hours.  Dispense: 14 capsule; Refill: 0  -     mupirocin (BACTROBAN) 2 % ointment; Apply topically 3 (three) times daily.  Dispense: 30 g; Refill: 1    2. Insect bite (nonvenomous), left lower leg, initial encounter  -     cefTRIAXone injection 1 g  -     cephALEXin (KEFLEX) 500 MG capsule; Take 1 capsule (500 mg total) by mouth every 12 (twelve) hours.  Dispense: 14 capsule; Refill: 0  -     mupirocin (BACTROBAN) 2 % ointment; Apply topically 3 (three) times daily.  Dispense: 30 g; Refill: 1           Return to clinic if symptoms worsen or fail to improve.    Questions answered to desired level of satisfaction    Verbalized understanding to all information and instructions provided      TREVOR Narayanan-[facility]

## 2025-03-14 DIAGNOSIS — M54.6 PAIN IN THORACIC SPINE: Primary | ICD-10-CM
